# Patient Record
Sex: FEMALE | Race: WHITE | HISPANIC OR LATINO | Employment: FULL TIME | URBAN - METROPOLITAN AREA
[De-identification: names, ages, dates, MRNs, and addresses within clinical notes are randomized per-mention and may not be internally consistent; named-entity substitution may affect disease eponyms.]

---

## 2017-04-10 ENCOUNTER — OFFICE VISIT (OUTPATIENT)
Dept: OBSTETRICS AND GYNECOLOGY | Facility: CLINIC | Age: 52
End: 2017-04-10
Payer: COMMERCIAL

## 2017-04-10 VITALS
BODY MASS INDEX: 29.76 KG/M2 | WEIGHT: 157.63 LBS | SYSTOLIC BLOOD PRESSURE: 120 MMHG | HEIGHT: 61 IN | DIASTOLIC BLOOD PRESSURE: 78 MMHG

## 2017-04-10 DIAGNOSIS — Z01.419 ROUTINE GYNECOLOGICAL EXAMINATION: Primary | ICD-10-CM

## 2017-04-10 PROCEDURE — 99999 PR PBB SHADOW E&M-NEW PATIENT-LVL III: CPT | Mod: PBBFAC,,, | Performed by: OBSTETRICS & GYNECOLOGY

## 2017-04-10 PROCEDURE — 99386 PREV VISIT NEW AGE 40-64: CPT | Mod: S$GLB,,, | Performed by: OBSTETRICS & GYNECOLOGY

## 2017-04-10 PROCEDURE — 88175 CYTOPATH C/V AUTO FLUID REDO: CPT

## 2017-04-10 RX ORDER — ATORVASTATIN CALCIUM 40 MG/1
40 TABLET, FILM COATED ORAL DAILY
COMMUNITY
End: 2019-01-14

## 2017-04-10 NOTE — PROGRESS NOTES
PT HERE FOR ANNUAL.    ROS:  GENERAL: No fever, chills, fatigability or weight loss.  VULVAR: No pain, no lesions and no itching.  VAGINAL: No relaxation, no itching, no discharge, no abnormal bleeding and no lesions.  ABDOMEN: No abdominal pain. Denies nausea. Denies vomiting. No diarrhea. No constipation  BREAST: Denies pain. No lumps. No discharge.  URINARY: No incontinence, no nocturia, no frequency and no dysuria.  CARDIOVASCULAR: No chest pain. No shortness of breath. No leg cramps.  NEUROLOGICAL: No headaches. No vision changes.  The remainder of the review of systems was negative.    PE:  General Appearance: obese And Well developed. Well nourished. In no acute distress.  Vulva: Lesions: No.  Urethral Meatus: Normal size. Normal location. No lesions. No prolapse.  Urethra: No masses. No tenderness. No prolapse. No scarring.  Bladder: No masses. No tenderness.  Vagina: Mucosa NI:yes discharge no, atrophy yes, cystocele no or rectocele no.  Cervix: Lesion: no  Stenotic: no Cervical motion tenderness: no  Uterus: Uterus size: 5 weeks. Support good. Uterus size: Normal  Adnexa: Masses: No Tenderness: No CDS Nodularity: No  Abdomen: obese No masses. No tenderness.  Breasts: No bilateral masses. No bilateral discharge. No bilateral tenderness. No bilateral fibrocystic changes.  Neck: No thyroid enlargement. No thyroid masses.  Skin: Rashes: No      PROCEDURES:    PLAN:     DIAGNOSIS:  1. Routine gynecological examination        MEDICATIONS & ORDERS:  Orders Placed This Encounter    Mammo Digital Screening Bilat with CAD    Liquid-based pap smear, screening       Patient was counseled today on the new ACS guidelines for cervical cytology screening as well as the current recommendations for breast cancer screening. She was counseled to follow up with her PCP for other routine health maintenance. Counseling session lasted approximately 10 minutes, and all her questions were answered.         FOLLOW-UP: With me in  12 month

## 2018-06-08 ENCOUNTER — HOSPITAL ENCOUNTER (EMERGENCY)
Facility: HOSPITAL | Age: 53
Discharge: HOME | End: 2018-06-09
Attending: EMERGENCY MEDICINE
Payer: COMMERCIAL

## 2018-06-08 DIAGNOSIS — R10.9 ABDOMINAL PAIN: ICD-10-CM

## 2018-06-08 DIAGNOSIS — R53.1 WEAKNESS: ICD-10-CM

## 2018-06-08 DIAGNOSIS — K80.20 CALCULUS OF GALLBLADDER WITHOUT CHOLECYSTITIS WITHOUT OBSTRUCTION: Primary | ICD-10-CM

## 2018-06-08 LAB
ALBUMIN SERPL BCP-MCNC: 3.9 G/DL
ALP SERPL-CCNC: 103 U/L
ALT SERPL W/O P-5'-P-CCNC: 24 U/L
ANION GAP SERPL CALC-SCNC: 9 MMOL/L
AST SERPL-CCNC: 23 U/L
B-HCG UR QL: NEGATIVE
BASOPHILS # BLD AUTO: 0.05 K/UL
BASOPHILS NFR BLD: 0.4 %
BILIRUB SERPL-MCNC: 0.5 MG/DL
BILIRUB UR QL STRIP: NEGATIVE
BUN SERPL-MCNC: 14 MG/DL
CALCIUM SERPL-MCNC: 9.6 MG/DL
CHLORIDE SERPL-SCNC: 105 MMOL/L
CLARITY UR: CLEAR
CO2 SERPL-SCNC: 25 MMOL/L
COLOR UR: YELLOW
CREAT SERPL-MCNC: 0.9 MG/DL
CTP QC/QA: YES
DIFFERENTIAL METHOD: ABNORMAL
EOSINOPHIL # BLD AUTO: 0.1 K/UL
EOSINOPHIL NFR BLD: 0.7 %
ERYTHROCYTE [DISTWIDTH] IN BLOOD BY AUTOMATED COUNT: 13.4 %
EST. GFR  (AFRICAN AMERICAN): >60 ML/MIN/1.73 M^2
EST. GFR  (NON AFRICAN AMERICAN): >60 ML/MIN/1.73 M^2
GLUCOSE SERPL-MCNC: 96 MG/DL
GLUCOSE UR QL STRIP: NEGATIVE
HCT VFR BLD AUTO: 40.9 %
HGB BLD-MCNC: 13.1 G/DL
HGB UR QL STRIP: ABNORMAL
KETONES UR QL STRIP: NEGATIVE
LEUKOCYTE ESTERASE UR QL STRIP: NEGATIVE
LYMPHOCYTES # BLD AUTO: 2.8 K/UL
LYMPHOCYTES NFR BLD: 20.9 %
MCH RBC QN AUTO: 28.5 PG
MCHC RBC AUTO-ENTMCNC: 32 G/DL
MCV RBC AUTO: 89 FL
MONOCYTES # BLD AUTO: 1.1 K/UL
MONOCYTES NFR BLD: 8.2 %
NEUTROPHILS # BLD AUTO: 9.4 K/UL
NEUTROPHILS NFR BLD: 69.5 %
NITRITE UR QL STRIP: NEGATIVE
PH UR STRIP: 7 [PH] (ref 5–8)
PLATELET # BLD AUTO: 288 K/UL
PMV BLD AUTO: 10.4 FL
POTASSIUM SERPL-SCNC: 3.9 MMOL/L
PROT SERPL-MCNC: 7.8 G/DL
PROT UR QL STRIP: ABNORMAL
RBC # BLD AUTO: 4.59 M/UL
SODIUM SERPL-SCNC: 139 MMOL/L
SP GR UR STRIP: 1.02 (ref 1–1.03)
URN SPEC COLLECT METH UR: ABNORMAL
UROBILINOGEN UR STRIP-ACNC: NEGATIVE EU/DL
WBC # BLD AUTO: 13.57 K/UL

## 2018-06-08 PROCEDURE — 83690 ASSAY OF LIPASE: CPT

## 2018-06-08 PROCEDURE — 63600175 PHARM REV CODE 636 W HCPCS: Performed by: EMERGENCY MEDICINE

## 2018-06-08 PROCEDURE — 99284 EMERGENCY DEPT VISIT MOD MDM: CPT | Mod: 25

## 2018-06-08 PROCEDURE — 96361 HYDRATE IV INFUSION ADD-ON: CPT

## 2018-06-08 PROCEDURE — 93005 ELECTROCARDIOGRAM TRACING: CPT

## 2018-06-08 PROCEDURE — 81025 URINE PREGNANCY TEST: CPT | Performed by: EMERGENCY MEDICINE

## 2018-06-08 PROCEDURE — 96374 THER/PROPH/DIAG INJ IV PUSH: CPT

## 2018-06-08 PROCEDURE — 85025 COMPLETE CBC W/AUTO DIFF WBC: CPT

## 2018-06-08 PROCEDURE — 93010 ELECTROCARDIOGRAM REPORT: CPT | Mod: ,,, | Performed by: INTERNAL MEDICINE

## 2018-06-08 PROCEDURE — 25000003 PHARM REV CODE 250: Performed by: EMERGENCY MEDICINE

## 2018-06-08 PROCEDURE — 81003 URINALYSIS AUTO W/O SCOPE: CPT

## 2018-06-08 PROCEDURE — 80053 COMPREHEN METABOLIC PANEL: CPT

## 2018-06-08 RX ORDER — ONDANSETRON 2 MG/ML
4 INJECTION INTRAMUSCULAR; INTRAVENOUS
Status: COMPLETED | OUTPATIENT
Start: 2018-06-08 | End: 2018-06-08

## 2018-06-08 RX ADMIN — SODIUM CHLORIDE 1000 ML: 0.9 INJECTION, SOLUTION INTRAVENOUS at 09:06

## 2018-06-08 RX ADMIN — ONDANSETRON 4 MG: 2 INJECTION INTRAMUSCULAR; INTRAVENOUS at 09:06

## 2018-06-09 VITALS
HEIGHT: 61 IN | TEMPERATURE: 99 F | OXYGEN SATURATION: 100 % | BODY MASS INDEX: 31.91 KG/M2 | DIASTOLIC BLOOD PRESSURE: 77 MMHG | HEART RATE: 70 BPM | WEIGHT: 169 LBS | SYSTOLIC BLOOD PRESSURE: 126 MMHG | RESPIRATION RATE: 16 BRPM

## 2018-06-09 LAB — LIPASE SERPL-CCNC: 27 U/L

## 2018-06-09 RX ORDER — HYDROCODONE BITARTRATE AND ACETAMINOPHEN 5; 325 MG/1; MG/1
1 TABLET ORAL EVERY 4 HOURS PRN
Qty: 18 TABLET | Refills: 0 | Status: SHIPPED | OUTPATIENT
Start: 2018-06-09 | End: 2019-01-14

## 2018-06-09 RX ORDER — ONDANSETRON 4 MG/1
4 TABLET, ORALLY DISINTEGRATING ORAL EVERY 6 HOURS PRN
Qty: 12 TABLET | Refills: 0 | Status: SHIPPED | OUTPATIENT
Start: 2018-06-09 | End: 2019-01-14

## 2018-06-09 NOTE — ED NOTES
Patient stated she has a little pain from having the US. States pain can be felt across lower abdomen but more on right side.

## 2018-06-09 NOTE — ED NOTES
Pt states she is having pain in rlq which is radiating around back and down to groin. She says she has hemorhoids and it's causing her to push very hard to have a bm.

## 2018-06-09 NOTE — ED PROVIDER NOTES
Encounter Date: 2018    SCRIBE #1 NOTE: I, Ted Johnson, am scribing for, and in the presence of, Dr. Monterroso.       History     Chief Complaint   Patient presents with    Abdominal Pain     To ER with c/o RLQ pain radiating to groin and around her abd with frequent urinataion and vomiting starting today.  pt just completed antibiotics yesterday for UTI.     Savannah Guerrero is a 52 y.o. female who  has no past medical history on file.    The patient presents to the ED due to lower abdominal pain starting today. The pain radiates to her bilateral flanks. She notes urinary frequency, nausea, and frequent vomiting today as well. Patient saw a urologist 1 week ago due to hematuria. She was prescribed antibiotics at that time. Patient denies fever or diarrhea.      The history is provided by the patient.     Review of patient's allergies indicates:  No Known Allergies  No past medical history on file.  Past Surgical History:   Procedure Laterality Date     SECTION  1994     Family History   Problem Relation Age of Onset    Breast cancer Neg Hx     Cancer Neg Hx     Colon cancer Neg Hx     Ovarian cancer Neg Hx      Social History   Substance Use Topics    Smoking status: Never Smoker    Smokeless tobacco: Not on file    Alcohol use No     Review of Systems   Constitutional: Negative for fever.   Gastrointestinal: Positive for abdominal pain, nausea and vomiting. Negative for diarrhea.   Genitourinary: Positive for flank pain and frequency.   All other systems reviewed and are negative.      Physical Exam     Initial Vitals [18]   BP Pulse Resp Temp SpO2   (!) 157/77 84 18 98.3 °F (36.8 °C) 100 %      MAP       103.67         Physical Exam    Nursing note and vitals reviewed.  Constitutional: She appears well-developed and well-nourished.   HENT:   Head: Normocephalic and atraumatic.   Eyes: Conjunctivae and EOM are normal. Pupils are equal, round, and reactive to light.   Neck: Normal range of  motion. Neck supple.   Cardiovascular: Normal rate, regular rhythm and normal heart sounds.   Pulmonary/Chest: Breath sounds normal. No respiratory distress. She has no wheezes.   Abdominal: Soft. She exhibits no distension. There is no tenderness. There is no guarding.   Musculoskeletal: Normal range of motion.   Neurological: She is alert and oriented to person, place, and time.   Skin: Skin is warm and dry.   Psychiatric: She has a normal mood and affect.         ED Course   Procedures  Labs Reviewed   CBC W/ AUTO DIFFERENTIAL - Abnormal; Notable for the following:        Result Value    WBC 13.57 (*)     Gran # (ANC) 9.4 (*)     Mono # 1.1 (*)     All other components within normal limits   LIPASE - Abnormal; Notable for the following:     Lipase <3 (*)     All other components within normal limits   URINALYSIS - Abnormal; Notable for the following:     Protein, UA Trace (*)     Occult Blood UA Trace (*)     All other components within normal limits   COMPREHENSIVE METABOLIC PANEL   URINALYSIS   POCT URINE PREGNANCY     EKG Readings: (Independently Interpreted)   Rhythm: Normal Sinus Rhythm. Heart Rate: 69. Ectopy: No Ectopy. Conduction: Normal. ST Segments: Normal ST Segments. T Waves: Normal. Clinical Impression: Normal Sinus Rhythm     Imaging Results          US Abdomen Limited (Gallbladder) (Final result)  Result time 06/09/18 00:50:56    Final result by Zbigniew Mcintyre MD (06/09/18 00:50:56)                 Impression:      1. Cholelithiasis.  No ultrasonographic evidence to suggest acute cholecystitis.  2. Right extrarenal pelvis versus mild hydronephrosis.  3. Cystic lesion at the level of the pancreatic tail measuring 2.5 cm.  No prior studies are available for comparison.  Recommend future nonemergent MR or CT follow-up if no previous outside imaging is available for comparison.  4. Mild hepatomegaly.      Electronically signed by: Zbigniew Mcintyre MD  Date:    06/09/2018  Time:    00:50              Narrative:    EXAMINATION:  US ABDOMEN LIMITED    CLINICAL HISTORY:  Unspecified abdominal pain    TECHNIQUE:  Limited ultrasound of the right upper quadrant of the abdomen (including pancreas, liver, gallbladder, common bile duct, and right kidney) was performed.    COMPARISON:  None.    FINDINGS:  The liver is is mildly enlarged measuring 18.5cm.  Hepatic parenchyma is homogeneous without evidence for masses.  No intra- or extrahepatic biliary ductal dilatation. The common bile duct measures 0.4 cm.  The gallbladder demonstrates two mobile stones, the larger of which measures 0.7 cm.  No evidence of gallbladder wall thickening or pericholecystic fluid.  Sonographic Deleon's sign is negative. The visualized portions of the IVC appear normal.  There is anechoic cystic lesion seen in the region of the pancreatic tail measuring 2.5 x 2.3 x 2.2 cm.  The right kidney measures 12.2 cm with prominent extrarenal pelvis versus mild hydronephrosis..  No ascites.                               X-Ray Abdomen Flat And Erect (Final result)  Result time 06/08/18 22:14:32    Final result by Shalonda Espinoza MD (06/08/18 22:14:32)                 Impression:      As above.      Electronically signed by: Shalonda Espinoza MD  Date:    06/08/2018  Time:    22:14             Narrative:    EXAMINATION:  XR ABDOMEN FLAT AND ERECT    CLINICAL HISTORY:  Abdominal Pain;    TECHNIQUE:  Flat and erect AP views of the abdomen were performed.    COMPARISON:  None.    FINDINGS:  Scattered air is seen in nondilated loops of small and large bowel with fecal material scattered throughout the colon.  No focally dilated loops of small bowel or small bowel air-fluid levels are identified.  No definite evidence of free intraperitoneal air.  There are punctate pelvic phleboliths present.  The visualized osseous structures appear intact.The visualized lung bases appear clear.                                   Medical Decision Making:   ED Management:  Pt has  gallstones on her u/s as possible cause of the pain. Her pain has resolved at this time.  It is intermittent pain.  Labs and vs are wnl. Will dc pt to f/u w gen surg              Attending Attestation:           Physician Attestation for Scribe:  Physician Attestation Statement for Scribe #1: I, Alek Monterroso, reviewed documentation, as scribed by Ted curtis in my presence, and it is both accurate and complete.                    Clinical Impression:   The primary encounter diagnosis was Calculus of gallbladder without cholecystitis without obstruction. Diagnoses of Weakness and Abdominal pain were also pertinent to this visit.      Disposition:   Disposition: Discharged  Condition: Stable                      Alek Monterroso MD  06/09/18 0117

## 2018-06-18 PROBLEM — K80.20 CALCULUS OF GALLBLADDER WITHOUT CHOLECYSTITIS WITHOUT OBSTRUCTION: Status: ACTIVE | Noted: 2018-06-18

## 2018-06-18 PROBLEM — R10.10 PAIN OF UPPER ABDOMEN: Status: ACTIVE | Noted: 2018-06-18

## 2018-06-18 PROBLEM — K86.2 CYSTIC MASS OF PANCREAS: Status: ACTIVE | Noted: 2018-06-18

## 2018-06-28 ENCOUNTER — OFFICE VISIT (OUTPATIENT)
Dept: GASTROENTEROLOGY | Facility: CLINIC | Age: 53
End: 2018-06-28
Payer: COMMERCIAL

## 2018-06-28 VITALS — WEIGHT: 163 LBS | BODY MASS INDEX: 30.8 KG/M2

## 2018-06-28 DIAGNOSIS — R21 RASH: ICD-10-CM

## 2018-06-28 DIAGNOSIS — K86.2 CYSTIC MASS OF PANCREAS: Primary | ICD-10-CM

## 2018-06-28 PROCEDURE — 99205 OFFICE O/P NEW HI 60 MIN: CPT | Mod: S$GLB,,, | Performed by: INTERNAL MEDICINE

## 2018-06-28 PROCEDURE — 99999 PR PBB SHADOW E&M-EST. PATIENT-LVL II: CPT | Mod: PBBFAC,,,

## 2018-06-28 PROCEDURE — 3008F BODY MASS INDEX DOCD: CPT | Mod: CPTII,S$GLB,, | Performed by: INTERNAL MEDICINE

## 2018-06-28 NOTE — PATIENT INSTRUCTIONS
EUS scheduled on  07/03/2018 with  Dr. Tray Whitley.  Location and instructions explained to patient. NPO after midnight.  Clear liquids after 700pm. Lab will call two days before with arrival time. Must have transportation due to sedation. Verbalized understanding.

## 2018-06-28 NOTE — PROGRESS NOTES
SUBJECTIVE:         Chief Complaint: Here for evaluation of a pancreatic cyst     History of Present Illness:  Patient is a 52 y.o. female presents with a pancreatic cyst. The cyst was first noted one month ago. It's located in the the tail.  It measures 25 mm  in size.  It is not symptomatic. She had seen Dr Martinez for abdominal pain related to her gallstone.  The Ultrasound for that work up showed the cyst Previous studies include CT scan, however we do not have the results of this.    Previous FNA of the cyst has not been done    There is not a family history of pancreatic cancer     The patient does not smoke.    ECOG status 0 - Asymptomatic    On a separate issue, she noticed a rash along her arms and chest and back yesterday.  Her CT scan with contrast was Thursday (temporally unrelated) She has no sick contacts and no recent new foods or medications. The rash is itchy    (Not in a hospital admission)    Review of patient's allergies indicates:  No Known Allergies     Past Medical History:   Diagnosis Date    Hyperlipidemia     Hypertension      Past Surgical History:   Procedure Laterality Date     SECTION       Family History   Problem Relation Age of Onset    Breast cancer Neg Hx     Cancer Neg Hx     Colon cancer Neg Hx     Ovarian cancer Neg Hx      Social History   Substance Use Topics    Smoking status: Never Smoker    Smokeless tobacco: Never Used    Alcohol use No     Vital signs reviewed  General: well-appearing, no acute distress   Neuro: awake, alert, oriented to person place and time; no asterixis   Head: normocephalic, atraumatic   Nose: no nasal lesions or turbinate swelling   Mouth: dentition intact; no oral mucosal lesions; no pharyngeal erythema   Neck: supple; no thyromegaly   CV: S1S2, regular rate,    MS: no skeletal deformities noted   Skin: red macular rash along anterior arms and chest.  No vesicle  Lymph: no cervical or supraclavicular LAD  noted    Diagnostic Results:  US: Reviewed      ASSESSMENT/PLAN:     Pancreatic cyst in the the tail. Measuring 25mm in size,   Most likely etiology at this point is a indeterminate  Year 0  We discussed in detail the natural history of pancreatic cysts, the therapy involved, and the benefits of multimodality imaging including Ct, MRI, and EUS with FNA    Plan:   We will plan for an EUS to assess the size and characteristics.  Of the cyst and plan for further surveillance afterwards    I have limited familiarity with her rash and encouraged her to reach out to her PCP

## 2018-07-03 ENCOUNTER — ANESTHESIA (OUTPATIENT)
Dept: ENDOSCOPY | Facility: HOSPITAL | Age: 53
End: 2018-07-03
Payer: COMMERCIAL

## 2018-07-03 ENCOUNTER — HOSPITAL ENCOUNTER (OUTPATIENT)
Facility: HOSPITAL | Age: 53
Discharge: HOME | End: 2018-07-03
Attending: INTERNAL MEDICINE | Admitting: INTERNAL MEDICINE
Payer: COMMERCIAL

## 2018-07-03 ENCOUNTER — ANESTHESIA EVENT (OUTPATIENT)
Dept: ENDOSCOPY | Facility: HOSPITAL | Age: 53
End: 2018-07-03
Payer: COMMERCIAL

## 2018-07-03 VITALS
RESPIRATION RATE: 60 BRPM | DIASTOLIC BLOOD PRESSURE: 96 MMHG | TEMPERATURE: 97 F | SYSTOLIC BLOOD PRESSURE: 144 MMHG | WEIGHT: 165 LBS | HEART RATE: 59 BPM | HEIGHT: 61 IN | BODY MASS INDEX: 31.15 KG/M2 | OXYGEN SATURATION: 100 %

## 2018-07-03 DIAGNOSIS — K86.2 CYSTIC MASS OF PANCREAS: Primary | ICD-10-CM

## 2018-07-03 DIAGNOSIS — K86.2 PANCREAS CYST: ICD-10-CM

## 2018-07-03 DIAGNOSIS — K29.70 GASTRITIS, PRESENCE OF BLEEDING UNSPECIFIED, UNSPECIFIED CHRONICITY, UNSPECIFIED GASTRITIS TYPE: ICD-10-CM

## 2018-07-03 PROCEDURE — 88112 CYTOPATH CELL ENHANCE TECH: CPT | Performed by: PATHOLOGY

## 2018-07-03 PROCEDURE — 43239 EGD BIOPSY SINGLE/MULTIPLE: CPT

## 2018-07-03 PROCEDURE — 63600175 PHARM REV CODE 636 W HCPCS: Performed by: NURSE ANESTHETIST, CERTIFIED REGISTERED

## 2018-07-03 PROCEDURE — 27201012 HC FORCEPS, HOT/COLD, DISP: Performed by: INTERNAL MEDICINE

## 2018-07-03 PROCEDURE — 27202059 HC NEEDLE, FNA (ANY): Performed by: INTERNAL MEDICINE

## 2018-07-03 PROCEDURE — 88305 TISSUE EXAM BY PATHOLOGIST: CPT | Mod: 26,,, | Performed by: PATHOLOGY

## 2018-07-03 PROCEDURE — 43242 EGD US FINE NEEDLE BX/ASPIR: CPT | Mod: ,,, | Performed by: INTERNAL MEDICINE

## 2018-07-03 PROCEDURE — 25000003 PHARM REV CODE 250: Performed by: INTERNAL MEDICINE

## 2018-07-03 PROCEDURE — 37000009 HC ANESTHESIA EA ADD 15 MINS: Performed by: INTERNAL MEDICINE

## 2018-07-03 PROCEDURE — 37000008 HC ANESTHESIA 1ST 15 MINUTES: Performed by: INTERNAL MEDICINE

## 2018-07-03 PROCEDURE — 43238 EGD US FINE NEEDLE BX/ASPIR: CPT | Performed by: INTERNAL MEDICINE

## 2018-07-03 PROCEDURE — 88342 IMHCHEM/IMCYTCHM 1ST ANTB: CPT | Performed by: PATHOLOGY

## 2018-07-03 PROCEDURE — 88342 IMHCHEM/IMCYTCHM 1ST ANTB: CPT | Mod: 26,,, | Performed by: PATHOLOGY

## 2018-07-03 PROCEDURE — 82378 CARCINOEMBRYONIC ANTIGEN: CPT

## 2018-07-03 PROCEDURE — 88305 TISSUE EXAM BY PATHOLOGIST: CPT | Performed by: PATHOLOGY

## 2018-07-03 PROCEDURE — 82150 ASSAY OF AMYLASE: CPT

## 2018-07-03 PROCEDURE — 43239 EGD BIOPSY SINGLE/MULTIPLE: CPT | Mod: 59,,, | Performed by: INTERNAL MEDICINE

## 2018-07-03 PROCEDURE — 88112 CYTOPATH CELL ENHANCE TECH: CPT | Mod: 26,,, | Performed by: PATHOLOGY

## 2018-07-03 RX ORDER — LIDOCAINE HCL/PF 100 MG/5ML
SYRINGE (ML) INTRAVENOUS
Status: DISCONTINUED | OUTPATIENT
Start: 2018-07-03 | End: 2018-07-03

## 2018-07-03 RX ORDER — CIPROFLOXACIN 2 MG/ML
INJECTION, SOLUTION INTRAVENOUS
Status: DISCONTINUED | OUTPATIENT
Start: 2018-07-03 | End: 2018-07-03

## 2018-07-03 RX ORDER — PROPOFOL 10 MG/ML
VIAL (ML) INTRAVENOUS
Status: DISCONTINUED | OUTPATIENT
Start: 2018-07-03 | End: 2018-07-03

## 2018-07-03 RX ORDER — PROPOFOL 10 MG/ML
VIAL (ML) INTRAVENOUS CONTINUOUS PRN
Status: DISCONTINUED | OUTPATIENT
Start: 2018-07-03 | End: 2018-07-03

## 2018-07-03 RX ORDER — CIPROFLOXACIN 500 MG/1
500 TABLET ORAL EVERY 12 HOURS
Qty: 10 TABLET | Refills: 0 | Status: SHIPPED | OUTPATIENT
Start: 2018-07-03 | End: 2018-07-08

## 2018-07-03 RX ORDER — SODIUM CHLORIDE 9 MG/ML
INJECTION, SOLUTION INTRAVENOUS CONTINUOUS
Status: DISCONTINUED | OUTPATIENT
Start: 2018-07-03 | End: 2018-07-03 | Stop reason: HOSPADM

## 2018-07-03 RX ADMIN — PROPOFOL 150 MCG/KG/MIN: 10 INJECTION, EMULSION INTRAVENOUS at 09:07

## 2018-07-03 RX ADMIN — CIPROFLOXACIN 400 MG: 2 INJECTION, SOLUTION INTRAVENOUS at 10:07

## 2018-07-03 RX ADMIN — PROPOFOL 70 MG: 10 INJECTION, EMULSION INTRAVENOUS at 09:07

## 2018-07-03 RX ADMIN — LIDOCAINE HYDROCHLORIDE 100 MG: 20 INJECTION, SOLUTION INTRAVENOUS at 09:07

## 2018-07-03 RX ADMIN — SODIUM CHLORIDE: 0.9 INJECTION, SOLUTION INTRAVENOUS at 08:07

## 2018-07-03 NOTE — H&P
History & Physical - Short Stay  Gastroenterology      SUBJECTIVE:     Procedure: EUS    Chief Complaint/Indication for Procedure: pancreas cyst    History of Present Illness:  Patient is a 52 y.o. female with pancreas cyst coming for Eval with EUS.     PTA Medications   Medication Sig    HYDROcodone-acetaminophen (NORCO) 5-325 mg per tablet Take 1 tablet by mouth every 4 (four) hours as needed.    ondansetron (ZOFRAN-ODT) 4 MG TbDL Take 1 tablet (4 mg total) by mouth every 6 (six) hours as needed.    atorvastatin (LIPITOR) 40 MG tablet Take 40 mg by mouth once daily.    lisinopril (PRINIVIL,ZESTRIL) 40 MG tablet Take 40 mg by mouth once daily.    OMEPRAZOLE ORAL Take by mouth.       Review of patient's allergies indicates:  No Known Allergies     Past Medical History:   Diagnosis Date    Hyperlipidemia     Hypertension      Past Surgical History:   Procedure Laterality Date     SECTION       Family History   Problem Relation Age of Onset    Breast cancer Neg Hx     Cancer Neg Hx     Colon cancer Neg Hx     Ovarian cancer Neg Hx      Social History   Substance Use Topics    Smoking status: Never Smoker    Smokeless tobacco: Never Used    Alcohol use No       Review of Systems:  Constitutional: no fever or chills  Gastrointestinal: no nausea or vomiting, no abdominal pain or change in bowel habits    OBJECTIVE:     Vital Signs (Most Recent)  Temp: 97.9 °F (36.6 °C) (18)  Pulse: 64 (18)  Resp: 16 (18)  BP: 127/87 (18)  SpO2: 98 % (18)       ASSESSMENT/PLAN:     Patient is a 52 y.o. female with pancreas cyst coming for Eval with EUS.     Plan: EUS    Anesthesia Plan: Moderate Sedation    ASA Grade: ASA 2 - Patient with mild systemic disease with no functional limitations

## 2018-07-03 NOTE — TRANSFER OF CARE
"Anesthesia Transfer of Care Note    Patient: Savannah Guerrero    Procedure(s) Performed: Procedure(s) (LRB):  ULTRASOUND, ENDOSCOPIC, UPPER GI TRACT (N/A)  EGD (ESOPHAGOGASTRODUODENOSCOPY) (N/A)    Patient location: GI    Anesthesia Type: MAC    Transport from OR: Transported from OR on room air with adequate spontaneous ventilation    Post pain: adequate analgesia    Post assessment: no apparent anesthetic complications and tolerated procedure well    Post vital signs: stable    Level of consciousness: awake, alert and oriented    Nausea/Vomiting: no nausea/vomiting    Complications: none    Transfer of care protocol was followed      Last vitals:   Visit Vitals  /87 (BP Location: Left arm, Patient Position: Lying)   Pulse 64   Temp 36.6 °C (97.9 °F) (Tympanic)   Resp 16   Ht 5' 1" (1.549 m)   Wt 74.8 kg (165 lb)   SpO2 98%   Breastfeeding? No   BMI 31.18 kg/m²     "

## 2018-07-03 NOTE — [employer] PATIENT INSTRUCTIONS
Discharge Summary/Instructions after an Endoscopic Procedure  Patient Name: Savannah Guerrero  Patient MRN: [id number]  Patient YOB: 1965 Tuesday, July 03, 2018  Efren Whitley MD  RESTRICTIONS:  During your procedure today, you received medications for sedation.  These   medications may affect your judgment, balance and coordination.  Therefore,   for 24 hours, you have the following restrictions:   - DO NOT drive a car, operate machinery, make legal/financial decisions,   sign important papers or drink alcohol.    ACTIVITY:  Today: no heavy lifting, straining or running due to procedural   sedation/anesthesia.  The following day: return to full activity including work.  DIET:  Eat and drink normally unless instructed otherwise.     TREATMENT FOR COMMON SIDE EFFECTS:  - Mild abdominal pain, nausea, belching, bloating or excessive gas:  rest,   eat lightly and use a heating pad.  - Sore Throat: treat with throat lozenges and/or gargle with warm salt   water.  - Because air was used during the procedure, expelling large amounts of air   from your rectum or belching is normal.  - If a bowel prep was taken, you may not have a bowel movement for 1-3 days.    This is normal.  SYMPTOMS TO WATCH FOR AND REPORT TO YOUR PHYSICIAN:  1. Abdominal pain or bloating, other than gas cramps.  2. Chest pain.  3. Back pain.  4. Signs of infection such as: chills or fever occurring within 24 hours   after the procedure.  5. Rectal bleeding, which would show as bright red, maroon, or black stools.   (A tablespoon of blood from the rectum is not serious, especially if   hemorrhoids are present.)  6. Vomiting.  7. Weakness or dizziness.  GO DIRECTLY TO THE NEAREST EMERGENCY ROOM IF YOU HAVE ANY OF THE FOLLOWING:      Difficulty breathing              Chills and/or fever over 101 F   Persistent vomiting and/or vomiting blood   Severe abdominal pain   Severe chest pain   Black, tarry stools   Bleeding- more than one  tablespoon   Any other symptom or condition that you feel may need urgent attention  Your doctor recommends these additional instructions:  If any biopsies were taken, your doctors clinic will contact you in 1 to 2   weeks with any results.  - Discharge patient to home.   - Resume previous diet.   - Continue present medications.   - Return to referring physician.   - Await cytology results.   - Ciprofloxacin 500 mg PO Bid for 3 days.  For questions, problems or results please call your physician - Efren Whitley MD at Work:  [phone].  EMERGENCY PHONE NUMBER: [phone],  LAB RESULTS: [phone]  IF A COMPLICATION OR EMERGENCY SITUATION ARISES AND YOU ARE UNABLE TO REACH   YOUR PHYSICIAN - GO DIRECTLY TO THE EMERGENCY ROOM.  Efren Whitley MD  7/3/2018 10:37:05 AM  This report has been verified and signed electronically.  [employer]

## 2018-07-03 NOTE — ANESTHESIA PREPROCEDURE EVALUATION
07/03/2018  Savannah Guerrero is a 52 y.o., female for upper EUS under MAC    Past Medical History:   Diagnosis Date    Hyperlipidemia     Hypertension          Anesthesia Evaluation    I have reviewed the Patient Summary Reports.     I have reviewed the Medications.     Review of Systems  Social:  Non-Smoker, No Alcohol Use    Cardiovascular:   Exercise tolerance: good Hypertension hyperlipidemia    Pulmonary:  Pulmonary Normal    Renal/:  Renal/ Normal     Hepatic/GI:   Pancreatic mass       Physical Exam  General:  Well nourished, Obesity    Airway/Jaw/Neck:  Airway Findings: Mouth Opening: Normal Tongue: Normal  General Airway Assessment: Adult  Mallampati: II  TM Distance: Normal, at least 6 cm      Dental:  Dental Findings: Periodontal disease, Mild   Chest/Lungs:  Chest/Lungs Clear    Heart/Vascular:  Heart Findings: Normal       Mental Status:  Mental Status Findings:  Alert and Oriented       Lab Results   Component Value Date    WBC 13.57 (H) 06/08/2018    HGB 13.1 06/08/2018    HCT 40.9 06/08/2018     06/08/2018    ALT 24 06/08/2018    AST 23 06/08/2018     06/08/2018    K 3.9 06/08/2018     06/08/2018    CREATININE 0.9 06/08/2018    BUN 14 06/08/2018    CO2 25 06/08/2018       Normal sinus rhythm  Normal ECG  No previous ECGs available  Confirmed by Elia ALVARADO, [facility] ([id number]) on 6/11/2018 2:42:34 PM    Anesthesia Plan  Type of Anesthesia, risks & benefits discussed:  Anesthesia Type:  MAC, general  Patient's Preference:   Intra-op Monitoring Plan:   Intra-op Monitoring Plan Comments:   Post Op Pain Control Plan:   Post Op Pain Control Plan Comments:   Induction:    Beta Blocker:  Patient is not currently on a Beta-Blocker (No further documentation required).       Informed Consent: Patient understands risks and agrees with Anesthesia plan.  Questions answered.  Anesthesia consent signed with patient.  ASA Score: 2     Day of Surgery Review of History & Physical:            Ready For Surgery From Anesthesia Perspective.

## 2018-07-03 NOTE — ANESTHESIA POSTPROCEDURE EVALUATION
"Anesthesia Post Evaluation    Patient: Savannah Guerrero    Procedure(s) Performed: Procedure(s) (LRB):  ULTRASOUND, ENDOSCOPIC, UPPER GI TRACT (N/A)  EGD (ESOPHAGOGASTRODUODENOSCOPY) (N/A)    Final Anesthesia Type: MAC  Patient location during evaluation: GI PACU  Patient participation: Yes- Able to Participate  Level of consciousness: awake and alert and oriented  Post-procedure vital signs: reviewed and stable  Pain management: adequate  Airway patency: patent  PONV status at discharge: No PONV  Anesthetic complications: no      Cardiovascular status: blood pressure returned to baseline, hemodynamically stable and stable  Respiratory status: spontaneous ventilation, room air and unassisted  Hydration status: euvolemic  Follow-up not needed.        Visit Vitals  /87 (BP Location: Left arm, Patient Position: Lying)   Pulse 64   Temp 36.6 °C (97.9 °F) (Tympanic)   Resp 16   Ht 5' 1" (1.549 m)   Wt 74.8 kg (165 lb)   SpO2 98%   Breastfeeding? No   BMI 31.18 kg/m²       Pain/Rohan Score: Pain Assessment Performed: Yes (7/3/2018  8:48 AM)  Presence of Pain: denies (7/3/2018  8:48 AM)      "

## 2018-07-05 LAB
AMYLASE, PANCREATIC FLUID: NORMAL U/L
BDY SITE: NORMAL
BDY SITE: NORMAL
CEA FLD-MCNC: 444 NG/ML

## 2018-07-11 ENCOUNTER — TELEPHONE (OUTPATIENT)
Dept: GASTROENTEROLOGY | Facility: CLINIC | Age: 53
End: 2018-07-11

## 2018-07-11 NOTE — TELEPHONE ENCOUNTER
----- Message from Stephanie Grover MA sent at 7/9/2018  4:26 PM CDT -----      ----- Message -----  From: Efren Whitley MD  Sent: 7/9/2018  11:16 AM  To: Stephanie Grover MA    Benign results but cyst could be premalignant and needs to be followed. Please let the patient know that we will discuss the timing of her next follow up in a cyst meeting and add to the list. Thanks

## 2018-07-11 NOTE — TELEPHONE ENCOUNTER
Results given to patient. Patient verbalized understanding. Will follow up with patient with recommendations after discussion with cyst team per Dr. Tray Whitley..

## 2018-07-19 ENCOUNTER — TELEPHONE (OUTPATIENT)
Dept: GASTROENTEROLOGY | Facility: CLINIC | Age: 53
End: 2018-07-19

## 2018-07-19 NOTE — TELEPHONE ENCOUNTER
----- Message from Liala Whitney sent at 7/19/2018  9:19 AM CDT -----  Contact: [phone]/self  Pt states she's returning your call   Please call and advise

## 2018-07-21 ENCOUNTER — TELEPHONE (OUTPATIENT)
Dept: GASTROENTEROLOGY | Facility: CLINIC | Age: 53
End: 2018-07-21

## 2018-07-23 ENCOUNTER — TELEPHONE (OUTPATIENT)
Dept: GASTROENTEROLOGY | Facility: CLINIC | Age: 53
End: 2018-07-23

## 2018-07-23 NOTE — TELEPHONE ENCOUNTER
----- Message from Melisa Cindy sent at 7/23/2018  4:26 PM CDT -----  Contact: self, [phone]  Patient called in returning your call. Please advise.

## 2018-08-02 ENCOUNTER — TELEPHONE (OUTPATIENT)
Dept: GASTROENTEROLOGY | Facility: CLINIC | Age: 53
End: 2018-08-02

## 2018-08-03 NOTE — TELEPHONE ENCOUNTER
Patient discussed at Pancreatic Cyst Conference  Consensus: Follow up in pancreatic cyst clinic in 3 months

## 2018-09-11 ENCOUNTER — PATIENT MESSAGE (OUTPATIENT)
Dept: ENDOSCOPY | Facility: HOSPITAL | Age: 53
End: 2018-09-11

## 2018-10-15 ENCOUNTER — PATIENT MESSAGE (OUTPATIENT)
Dept: GASTROENTEROLOGY | Facility: CLINIC | Age: 53
End: 2018-10-15

## 2018-10-25 ENCOUNTER — OFFICE VISIT (OUTPATIENT)
Dept: GASTROENTEROLOGY | Facility: CLINIC | Age: 53
End: 2018-10-25
Payer: COMMERCIAL

## 2018-10-25 VITALS
HEART RATE: 77 BPM | HEIGHT: 61 IN | BODY MASS INDEX: 31.59 KG/M2 | WEIGHT: 167.31 LBS | DIASTOLIC BLOOD PRESSURE: 76 MMHG | SYSTOLIC BLOOD PRESSURE: 119 MMHG

## 2018-10-25 DIAGNOSIS — K86.2 PANCREAS CYST: ICD-10-CM

## 2018-10-25 DIAGNOSIS — K86.2 PANCREATIC CYST: ICD-10-CM

## 2018-10-25 PROCEDURE — 99999 PR PBB SHADOW E&M-EST. PATIENT-LVL III: CPT | Mod: PBBFAC,,, | Performed by: INTERNAL MEDICINE

## 2018-10-25 PROCEDURE — 3008F BODY MASS INDEX DOCD: CPT | Mod: CPTII,S$GLB,, | Performed by: INTERNAL MEDICINE

## 2018-10-25 PROCEDURE — 99214 OFFICE O/P EST MOD 30 MIN: CPT | Mod: S$GLB,,, | Performed by: INTERNAL MEDICINE

## 2018-10-25 NOTE — PROGRESS NOTES
Gastroenterology: [facility]      SUBJECTIVE:         Chief Complaint: Here for evaluation of a pancreatic cyst     History of Present Illness:  Patient is a 53 y.o. female presents with a pancreatic cyst. The cyst was first noted 2018 during a visit to the ED for biliary colic.  Subsequent to the abdominal us she had an EUS. It's located in the the tail.  It measures 23 by 25 mm  in size.  It is not symptomatic. Previous studies include only ultrasound and EUS.     Previous FNA of the cyst has been done.  CEA =404  Amylase=40,000    There is not a family history of pancreatic cancer     The patient does not smoke.    ECOG status 0 - Asymptomatic      (Not in a hospital admission)    Review of patient's allergies indicates:  No Known Allergies     Past Medical History:   Diagnosis Date    Hyperlipidemia     Hypertension      Past Surgical History:   Procedure Laterality Date     SECTION      EGD (ESOPHAGOGASTRODUODENOSCOPY) N/A 7/3/2018    Performed by Efren Whitley MD at [facility]    ENDOSCOPIC ULTRASOUND OF UPPER GASTROINTESTINAL TRACT N/A 7/3/2018    Procedure: ULTRASOUND, ENDOSCOPIC, UPPER GI TRACT;  Surgeon: Efren Whitley MD;  Location: [facility];  Service: Endoscopy;  Laterality: N/A;    ESOPHAGOGASTRODUODENOSCOPY N/A 7/3/2018    Procedure: EGD (ESOPHAGOGASTRODUODENOSCOPY);  Surgeon: Efren Whitley MD;  Location: [facility];  Service: Endoscopy;  Laterality: N/A;    ULTRASOUND, ENDOSCOPIC, UPPER GI TRACT N/A 7/3/2018    Performed by Efren Whitley MD at [facility]     Family History   Problem Relation Age of Onset    Breast cancer Neg Hx     Cancer Neg Hx     Colon cancer Neg Hx     Ovarian cancer Neg Hx     Esophageal cancer Neg Hx      Social History     Tobacco Use    Smoking status: Never Smoker    Smokeless tobacco: Never Used   Substance Use Topics    Alcohol use: No    Drug use: No       Review of Systems:  A complete review of  systems was performed and other than described in the HPI and below is negative       OBJECTIVE:     Vital Signs (Most Recent)  Pulse: 77 (10/25/18 0907)  BP: 119/76 (10/25/18 0907)        Diagnostic Results:  EUS      An anechoic lesion suggestive of a cyst was identified in the        pancreatic body. The lesion measured 24 mm by 23 mm in maximal        cross-sectional diameter. There was a single compartment without        septae. The outer wall of the lesion was thick. There was internal        debris within the fluid-filled cavity. Diagnostic needle aspiration        for fluid was performed.      ASSESSMENT/PLAN:     Pancreatic cyst in the the tail. Measuring 24 x 23 mm in size,  No follow up exam has been performed  Most likely etiology at this point is a undiffentited  Year 0  We discussed in detail the natural history of pancreatic cysts, the therapy involved, and the benefits of multimodality imaging including Ct, MRI, and EUS with FNA    Plan:   We will be discussing her case at the pancreactic cyst clinic multidisciplinary conference to finalize a treatment plan.Current ACR guidelines would suggest perform cross-sectional imaging 6 months from her last EUS, to occur in 12/2018    We spent 25 minute in today's clinic with greater than 50% of the time dedicated to counseling and arranging care.

## 2018-10-29 ENCOUNTER — TELEPHONE (OUTPATIENT)
Dept: ENDOSCOPY | Facility: HOSPITAL | Age: 53
End: 2018-10-29

## 2018-11-02 ENCOUNTER — TELEPHONE (OUTPATIENT)
Dept: ENDOSCOPY | Facility: HOSPITAL | Age: 53
End: 2018-11-02

## 2018-12-04 ENCOUNTER — HOSPITAL ENCOUNTER (OUTPATIENT)
Dept: RADIOLOGY | Facility: HOSPITAL | Age: 53
Discharge: HOME | End: 2018-12-04
Attending: INTERNAL MEDICINE
Payer: COMMERCIAL

## 2018-12-04 DIAGNOSIS — K86.2 PANCREATIC CYST: ICD-10-CM

## 2018-12-04 PROCEDURE — 74183 MRI ABD W/O CNTR FLWD CNTR: CPT | Mod: 26,,, | Performed by: RADIOLOGY

## 2018-12-04 PROCEDURE — 74183 MRI ABD W/O CNTR FLWD CNTR: CPT | Mod: TC

## 2018-12-04 PROCEDURE — A9585 GADOBUTROL INJECTION: HCPCS | Performed by: INTERNAL MEDICINE

## 2018-12-04 PROCEDURE — 25500020 PHARM REV CODE 255: Performed by: INTERNAL MEDICINE

## 2018-12-04 RX ORDER — GADOBUTROL 604.72 MG/ML
10 INJECTION INTRAVENOUS
Status: COMPLETED | OUTPATIENT
Start: 2018-12-04 | End: 2018-12-04

## 2018-12-04 RX ADMIN — GADOBUTROL 10 ML: 604.72 INJECTION INTRAVENOUS at 10:12

## 2018-12-06 ENCOUNTER — TELEPHONE (OUTPATIENT)
Dept: ENDOSCOPY | Facility: HOSPITAL | Age: 53
End: 2018-12-06

## 2018-12-06 NOTE — TELEPHONE ENCOUNTER
----- Message from Maged Cedillo MD sent at 12/6/2018  8:04 AM CST -----  Can you let her know her cyst hasn't changes in size. We'll need to do an EUS in May 2019

## 2018-12-06 NOTE — TELEPHONE ENCOUNTER
----- Message from Kathryn Aguilar sent at 12/6/2018  2:42 PM CST -----  Contact: self [phone]  Mamadou - returning your call - please call patient at [phone]

## 2018-12-06 NOTE — TELEPHONE ENCOUNTER
----- Message from Maged Cedillo MD sent at 12/6/2018  8:04 AM CST -----  Can you let her know her cyst hasn't changes in size. We'll need to do an EUS in May 2019      Patient informed.

## 2018-12-15 ENCOUNTER — HOSPITAL ENCOUNTER (EMERGENCY)
Facility: HOSPITAL | Age: 53
Discharge: LEFT BEFORE DISCHARGE | End: 2018-12-15
Attending: EMERGENCY MEDICINE
Payer: COMMERCIAL

## 2018-12-15 VITALS
RESPIRATION RATE: 14 BRPM | HEIGHT: 60 IN | DIASTOLIC BLOOD PRESSURE: 77 MMHG | SYSTOLIC BLOOD PRESSURE: 142 MMHG | OXYGEN SATURATION: 95 % | TEMPERATURE: 99 F | WEIGHT: 165 LBS | HEART RATE: 79 BPM | BODY MASS INDEX: 32.39 KG/M2

## 2018-12-15 DIAGNOSIS — R10.9 ABDOMINAL PAIN: ICD-10-CM

## 2018-12-15 DIAGNOSIS — K85.90 ACUTE PANCREATITIS, UNSPECIFIED COMPLICATION STATUS, UNSPECIFIED PANCREATITIS TYPE: Primary | ICD-10-CM

## 2018-12-15 LAB
ALBUMIN SERPL BCP-MCNC: 4 G/DL
ALP SERPL-CCNC: 116 U/L
ALT SERPL W/O P-5'-P-CCNC: 33 U/L
ANION GAP SERPL CALC-SCNC: 15 MMOL/L
AST SERPL-CCNC: 25 U/L
BASOPHILS # BLD AUTO: 0.03 K/UL
BASOPHILS NFR BLD: 0.1 %
BILIRUB SERPL-MCNC: 0.4 MG/DL
BUN SERPL-MCNC: 13 MG/DL
CALCIUM SERPL-MCNC: 9.8 MG/DL
CHLORIDE SERPL-SCNC: 106 MMOL/L
CO2 SERPL-SCNC: 20 MMOL/L
CREAT SERPL-MCNC: 0.7 MG/DL
DIFFERENTIAL METHOD: ABNORMAL
EOSINOPHIL # BLD AUTO: 0 K/UL
EOSINOPHIL NFR BLD: 0.1 %
ERYTHROCYTE [DISTWIDTH] IN BLOOD BY AUTOMATED COUNT: 13.4 %
EST. GFR  (AFRICAN AMERICAN): >60 ML/MIN/1.73 M^2
EST. GFR  (NON AFRICAN AMERICAN): >60 ML/MIN/1.73 M^2
GLUCOSE SERPL-MCNC: 130 MG/DL
HCT VFR BLD AUTO: 45.1 %
HGB BLD-MCNC: 14.5 G/DL
LIPASE SERPL-CCNC: >1000 U/L
LYMPHOCYTES # BLD AUTO: 2.9 K/UL
LYMPHOCYTES NFR BLD: 13.4 %
MCH RBC QN AUTO: 29.1 PG
MCHC RBC AUTO-ENTMCNC: 32.2 G/DL
MCV RBC AUTO: 91 FL
MONOCYTES # BLD AUTO: 0.8 K/UL
MONOCYTES NFR BLD: 3.8 %
NEUTROPHILS # BLD AUTO: 17.7 K/UL
NEUTROPHILS NFR BLD: 82.1 %
PLATELET # BLD AUTO: 291 K/UL
PMV BLD AUTO: 10.1 FL
POTASSIUM SERPL-SCNC: 3.8 MMOL/L
PROT SERPL-MCNC: 8.5 G/DL
RBC # BLD AUTO: 4.98 M/UL
SODIUM SERPL-SCNC: 141 MMOL/L
WBC # BLD AUTO: 21.53 K/UL

## 2018-12-15 PROCEDURE — 25000003 PHARM REV CODE 250: Performed by: PHYSICIAN ASSISTANT

## 2018-12-15 PROCEDURE — 96375 TX/PRO/DX INJ NEW DRUG ADDON: CPT

## 2018-12-15 PROCEDURE — 25500020 PHARM REV CODE 255: Performed by: EMERGENCY MEDICINE

## 2018-12-15 PROCEDURE — 80053 COMPREHEN METABOLIC PANEL: CPT

## 2018-12-15 PROCEDURE — 85025 COMPLETE CBC W/AUTO DIFF WBC: CPT

## 2018-12-15 PROCEDURE — 83690 ASSAY OF LIPASE: CPT

## 2018-12-15 PROCEDURE — 99285 EMERGENCY DEPT VISIT HI MDM: CPT | Mod: 25

## 2018-12-15 PROCEDURE — 96376 TX/PRO/DX INJ SAME DRUG ADON: CPT

## 2018-12-15 PROCEDURE — 96361 HYDRATE IV INFUSION ADD-ON: CPT

## 2018-12-15 PROCEDURE — 63600175 PHARM REV CODE 636 W HCPCS: Performed by: PHYSICIAN ASSISTANT

## 2018-12-15 PROCEDURE — 96365 THER/PROPH/DIAG IV INF INIT: CPT

## 2018-12-15 PROCEDURE — 63600175 PHARM REV CODE 636 W HCPCS: Performed by: EMERGENCY MEDICINE

## 2018-12-15 PROCEDURE — 25000003 PHARM REV CODE 250: Performed by: EMERGENCY MEDICINE

## 2018-12-15 RX ORDER — ONDANSETRON 2 MG/ML
4 INJECTION INTRAMUSCULAR; INTRAVENOUS
Status: COMPLETED | OUTPATIENT
Start: 2018-12-15 | End: 2018-12-15

## 2018-12-15 RX ORDER — MORPHINE SULFATE 2 MG/ML
2 INJECTION, SOLUTION INTRAMUSCULAR; INTRAVENOUS
Status: COMPLETED | OUTPATIENT
Start: 2018-12-15 | End: 2018-12-15

## 2018-12-15 RX ORDER — MORPHINE SULFATE 4 MG/ML
4 INJECTION, SOLUTION INTRAMUSCULAR; INTRAVENOUS
Status: COMPLETED | OUTPATIENT
Start: 2018-12-15 | End: 2018-12-15

## 2018-12-15 RX ADMIN — IOHEXOL 100 ML: 350 INJECTION, SOLUTION INTRAVENOUS at 03:12

## 2018-12-15 RX ADMIN — PIPERACILLIN AND TAZOBACTAM 4.5 G: 4; .5 INJECTION, POWDER, LYOPHILIZED, FOR SOLUTION INTRAVENOUS; PARENTERAL at 03:12

## 2018-12-15 RX ADMIN — ONDANSETRON 4 MG: 2 INJECTION INTRAMUSCULAR; INTRAVENOUS at 04:12

## 2018-12-15 RX ADMIN — SODIUM CHLORIDE 1000 ML: 0.9 INJECTION, SOLUTION INTRAVENOUS at 02:12

## 2018-12-15 RX ADMIN — MORPHINE SULFATE 2 MG: 4 INJECTION INTRAVENOUS at 04:12

## 2018-12-15 RX ADMIN — MORPHINE SULFATE 2 MG: 2 INJECTION, SOLUTION INTRAMUSCULAR; INTRAVENOUS at 02:12

## 2018-12-15 RX ADMIN — ONDANSETRON 4 MG: 2 INJECTION INTRAMUSCULAR; INTRAVENOUS at 02:12

## 2018-12-15 RX ADMIN — ONDANSETRON 4 MG: 2 INJECTION INTRAMUSCULAR; INTRAVENOUS at 06:12

## 2018-12-15 NOTE — ED NOTES
APPEARANCE: Alert, oriented and in no acute distress.  CARDIAC: Normal rate and rhythm, no murmur heard. BLOOD PRESSURE ELEVATED  PERIPHERAL VASCULAR: peripheral pulses present. Normal cap refill. No edema. Warm to touch.    RESPIRATORY:Normal rate and effort, breath sounds clear bilaterally throughout chest. Respirations are equal and unlabored no obvious signs of distress.  GASTRO: C/O LLQ ABDOMINAL PAIN 10/10 TENDERNESS UPON PALPATION.  MUSC: Full ROM. No bony tenderness or soft tissue tenderness. No obvious deformity.  SKIN: Skin is warm and dry, normal skin turgor, mucous membranes moist.  NEURO: 5/5 strength major flexors/extensors bilaterally. Sensory intact to light touch bilaterally. [place] coma scale: eyes open spontaneously-4, oriented & converses-5, obeys commands-6. No neurological abnormalities.   MENTAL STATUS: awake, alert and aware of environment.  EYE: PERRL, both eyes: pupils brisk and reactive to light. Normal size.  ENT: EARS: no obvious drainage. NOSE: no active bleeding.

## 2018-12-15 NOTE — ED NOTES
Patient presents to ED with c/o abdominal pain to the LLQ with nausea and vomiting since this morning.

## 2018-12-16 NOTE — PLAN OF CARE
[facility] was consulted for a patient in the ER concerning for pancreatitis    Brief history: Patient was diagnosed with pancreatitic cyst in 06/2018. Biopsy in 07/2018  Confirmed cystic nature of the lesion and was instructed to continued follow up with GI. Patient was scheduled for EUS in 12/2018. On 12/4/18 patient underwent MRI which confirmed pancreatitic lesion (unchanged) with cholecystitis     This morning, patient awoke with left sided abdominal pain which was associated with nausea and NBNB emesis. Patient was taken to [facility] ER by son and sister. CT abdomen was significant for pancreatitis with concern for pancreatitic necrosis not being able to be ruled out. Lab work revealed leucocytosis with Lipase >1000. ER physician, Dr. Hillman, contacted GI who advised for the patient to come in for medical management with plan for EUS on 12/17. Upon [facility] evaluation, plan was explained to son and sister who expressed frustration of something not being done today. They expressed their desire to be transferred to [facility]. Risks and benefits were discussed including risk of clinical worsening if not admitted for treatment of pancreatitis with concern for pancreatic necrosis. Family insisted again to be transferred to another facility. Patient and family will leave AMA and will sign paperwork in ER    Christopher Garcia, DO   Internal Medicine/Pediatrics [facility]

## 2019-01-14 PROBLEM — K80.10 CALCULUS OF GALLBLADDER WITH CHRONIC CHOLECYSTITIS WITHOUT OBSTRUCTION: Status: ACTIVE | Noted: 2018-06-18

## 2019-03-18 PROBLEM — Z90.49 S/P LAPAROSCOPIC CHOLECYSTECTOMY: Status: ACTIVE | Noted: 2019-03-18

## 2019-04-05 ENCOUNTER — TELEPHONE (OUTPATIENT)
Dept: ENDOSCOPY | Facility: HOSPITAL | Age: 54
End: 2019-04-05

## 2019-04-05 DIAGNOSIS — K86.2 CYST AND PSEUDOCYST OF PANCREAS: Primary | ICD-10-CM

## 2019-04-05 DIAGNOSIS — K86.3 CYST AND PSEUDOCYST OF PANCREAS: Primary | ICD-10-CM

## 2019-04-17 ENCOUNTER — TELEPHONE (OUTPATIENT)
Dept: GASTROENTEROLOGY | Facility: CLINIC | Age: 54
End: 2019-04-17

## 2019-05-02 ENCOUNTER — TELEPHONE (OUTPATIENT)
Dept: GASTROENTEROLOGY | Facility: CLINIC | Age: 54
End: 2019-05-02

## 2019-05-02 NOTE — TELEPHONE ENCOUNTER
Patient is questioning the need for repeat EUS this month. She believed she was only getting a MRI.

## 2019-05-14 ENCOUNTER — TELEPHONE (OUTPATIENT)
Dept: GASTROENTEROLOGY | Facility: CLINIC | Age: 54
End: 2019-05-14

## 2019-05-14 NOTE — TELEPHONE ENCOUNTER
----- Message from Rhina Agosto sent at 5/14/2019  3:32 PM CDT -----  Contact: [phone]/self  Patient is requesting a call back regarding cancelling her procedure. Please call her. Thanks

## 2019-06-18 ENCOUNTER — TELEPHONE (OUTPATIENT)
Dept: GASTROENTEROLOGY | Facility: CLINIC | Age: 54
End: 2019-06-18

## 2019-07-16 ENCOUNTER — TELEPHONE (OUTPATIENT)
Dept: GASTROENTEROLOGY | Facility: CLINIC | Age: 54
End: 2019-07-16

## 2019-07-16 NOTE — TELEPHONE ENCOUNTER
Spoke with pt and she is waiting to have a Colonoscopy at [facility] on 8/8/19 before she has the UEUS procedure. Patient said that she would call the clinic back to schedule when she is ready.

## 2019-07-31 ENCOUNTER — TELEPHONE (OUTPATIENT)
Dept: GASTROENTEROLOGY | Facility: CLINIC | Age: 54
End: 2019-07-31

## 2019-09-04 ENCOUNTER — TELEPHONE (OUTPATIENT)
Dept: GASTROENTEROLOGY | Facility: CLINIC | Age: 54
End: 2019-09-04

## 2019-09-04 DIAGNOSIS — K86.2 CYSTIC MASS OF PANCREAS: Primary | ICD-10-CM

## 2019-09-04 NOTE — TELEPHONE ENCOUNTER
EUS scheduled on 10/9/19 at 700am with Dr. Tray Whitley.  Location and instructions explained to patient. NPO after midnight.  Clear liquids after 700pm. Lab will call two days before with arrival time. Must have transportation due to sedation. Verbalized understanding.

## 2019-10-01 ENCOUNTER — TELEPHONE (OUTPATIENT)
Dept: ENDOSCOPY | Facility: HOSPITAL | Age: 54
End: 2019-10-01

## 2019-10-09 ENCOUNTER — HOSPITAL ENCOUNTER (OUTPATIENT)
Facility: HOSPITAL | Age: 54
Discharge: HOME | End: 2019-10-09
Attending: INTERNAL MEDICINE | Admitting: INTERNAL MEDICINE
Payer: MEDICAID

## 2019-10-09 ENCOUNTER — ANESTHESIA (OUTPATIENT)
Dept: ENDOSCOPY | Facility: HOSPITAL | Age: 54
End: 2019-10-09
Payer: MEDICAID

## 2019-10-09 ENCOUNTER — ANESTHESIA EVENT (OUTPATIENT)
Dept: ENDOSCOPY | Facility: HOSPITAL | Age: 54
End: 2019-10-09
Payer: MEDICAID

## 2019-10-09 VITALS
SYSTOLIC BLOOD PRESSURE: 132 MMHG | TEMPERATURE: 98 F | RESPIRATION RATE: 19 BRPM | BODY MASS INDEX: 32.1 KG/M2 | HEIGHT: 61 IN | HEART RATE: 68 BPM | WEIGHT: 170 LBS | DIASTOLIC BLOOD PRESSURE: 74 MMHG | OXYGEN SATURATION: 100 %

## 2019-10-09 DIAGNOSIS — K86.2 PANCREAS CYST: ICD-10-CM

## 2019-10-09 PROCEDURE — 88173 CYTOLOGY SPECIMEN- FNA RADIOLOGY GUIDED, BRONCH/EBUS, EUS/GI: ICD-10-PCS | Mod: 26,,, | Performed by: PATHOLOGY

## 2019-10-09 PROCEDURE — D9220A PRA ANESTHESIA: Mod: CRNA,,, | Performed by: NURSE ANESTHETIST, CERTIFIED REGISTERED

## 2019-10-09 PROCEDURE — 63600175 PHARM REV CODE 636 W HCPCS: Performed by: NURSE ANESTHETIST, CERTIFIED REGISTERED

## 2019-10-09 PROCEDURE — 43242 EGD US FINE NEEDLE BX/ASPIR: CPT | Mod: ,,, | Performed by: INTERNAL MEDICINE

## 2019-10-09 PROCEDURE — D9220A PRA ANESTHESIA: Mod: ANES,,, | Performed by: ANESTHESIOLOGY

## 2019-10-09 PROCEDURE — 88173 CYTOPATH EVAL FNA REPORT: CPT | Mod: 26,,, | Performed by: PATHOLOGY

## 2019-10-09 PROCEDURE — D9220A PRA ANESTHESIA: ICD-10-PCS | Mod: ANES,,, | Performed by: ANESTHESIOLOGY

## 2019-10-09 PROCEDURE — 88172 CYTP DX EVAL FNA 1ST EA SITE: CPT | Mod: 26,,, | Performed by: PATHOLOGY

## 2019-10-09 PROCEDURE — 43242 PR UPGI ENDOSCOPY,FN NEEDLE BX,GUIDED: ICD-10-PCS | Mod: ,,, | Performed by: INTERNAL MEDICINE

## 2019-10-09 PROCEDURE — 88172 CYTOLOGY SPECIMEN- FNA RADIOLOGY GUIDED, BRONCH/EBUS, EUS/GI: ICD-10-PCS | Mod: 26,,, | Performed by: PATHOLOGY

## 2019-10-09 PROCEDURE — 37000008 HC ANESTHESIA 1ST 15 MINUTES: Performed by: INTERNAL MEDICINE

## 2019-10-09 PROCEDURE — 63600175 PHARM REV CODE 636 W HCPCS: Performed by: INTERNAL MEDICINE

## 2019-10-09 PROCEDURE — 27202059 HC NEEDLE, FNA (ANY): Performed by: INTERNAL MEDICINE

## 2019-10-09 PROCEDURE — 88305 TISSUE EXAM BY PATHOLOGIST: CPT | Performed by: PATHOLOGY

## 2019-10-09 PROCEDURE — 43242 EGD US FINE NEEDLE BX/ASPIR: CPT | Performed by: INTERNAL MEDICINE

## 2019-10-09 PROCEDURE — D9220A PRA ANESTHESIA: ICD-10-PCS | Mod: CRNA,,, | Performed by: NURSE ANESTHETIST, CERTIFIED REGISTERED

## 2019-10-09 PROCEDURE — 88305 TISSUE EXAM BY PATHOLOGIST: CPT | Mod: 26,,, | Performed by: PATHOLOGY

## 2019-10-09 PROCEDURE — 88305 CYTOLOGY SPECIMEN- FNA RADIOLOGY GUIDED, BRONCH/EBUS, EUS/GI: ICD-10-PCS | Mod: 26,,, | Performed by: PATHOLOGY

## 2019-10-09 PROCEDURE — 37000009 HC ANESTHESIA EA ADD 15 MINS: Performed by: INTERNAL MEDICINE

## 2019-10-09 PROCEDURE — 00731 ANES UPR GI NDSC PX NOS: CPT | Performed by: INTERNAL MEDICINE

## 2019-10-09 RX ORDER — ONDANSETRON 2 MG/ML
4 INJECTION INTRAMUSCULAR; INTRAVENOUS ONCE AS NEEDED
Status: DISCONTINUED | OUTPATIENT
Start: 2019-10-09 | End: 2019-10-09 | Stop reason: HOSPADM

## 2019-10-09 RX ORDER — PROPOFOL 10 MG/ML
VIAL (ML) INTRAVENOUS
Status: DISCONTINUED | OUTPATIENT
Start: 2019-10-09 | End: 2019-10-09

## 2019-10-09 RX ORDER — MIDAZOLAM HYDROCHLORIDE 1 MG/ML
INJECTION, SOLUTION INTRAMUSCULAR; INTRAVENOUS
Status: DISCONTINUED | OUTPATIENT
Start: 2019-10-09 | End: 2019-10-09

## 2019-10-09 RX ORDER — SODIUM CHLORIDE 0.9 % (FLUSH) 0.9 %
10 SYRINGE (ML) INJECTION
Status: DISCONTINUED | OUTPATIENT
Start: 2019-10-09 | End: 2019-10-09 | Stop reason: HOSPADM

## 2019-10-09 RX ORDER — SODIUM CHLORIDE 9 MG/ML
INJECTION, SOLUTION INTRAVENOUS CONTINUOUS
Status: DISCONTINUED | OUTPATIENT
Start: 2019-10-09 | End: 2019-10-09 | Stop reason: HOSPADM

## 2019-10-09 RX ORDER — LIDOCAINE HCL/PF 100 MG/5ML
SYRINGE (ML) INTRAVENOUS
Status: DISCONTINUED | OUTPATIENT
Start: 2019-10-09 | End: 2019-10-09

## 2019-10-09 RX ORDER — PROPOFOL 10 MG/ML
VIAL (ML) INTRAVENOUS CONTINUOUS PRN
Status: DISCONTINUED | OUTPATIENT
Start: 2019-10-09 | End: 2019-10-09

## 2019-10-09 RX ORDER — CIPROFLOXACIN 2 MG/ML
INJECTION, SOLUTION INTRAVENOUS
Status: DISCONTINUED | OUTPATIENT
Start: 2019-10-09 | End: 2019-10-09

## 2019-10-09 RX ADMIN — MIDAZOLAM HYDROCHLORIDE 2 MG: 1 INJECTION, SOLUTION INTRAMUSCULAR; INTRAVENOUS at 01:10

## 2019-10-09 RX ADMIN — PROPOFOL 70 MG: 10 INJECTION, EMULSION INTRAVENOUS at 01:10

## 2019-10-09 RX ADMIN — LIDOCAINE HYDROCHLORIDE 75 MG: 20 INJECTION, SOLUTION INTRAVENOUS at 01:10

## 2019-10-09 RX ADMIN — CIPROFLOXACIN 400 MG: 2 INJECTION, SOLUTION INTRAVENOUS at 01:10

## 2019-10-09 RX ADMIN — PROPOFOL 150 MCG/KG/MIN: 10 INJECTION, EMULSION INTRAVENOUS at 01:10

## 2019-10-09 RX ADMIN — PROPOFOL 30 MG: 10 INJECTION, EMULSION INTRAVENOUS at 01:10

## 2019-10-09 RX ADMIN — SODIUM CHLORIDE: 0.9 INJECTION, SOLUTION INTRAVENOUS at 12:10

## 2019-10-09 NOTE — ANESTHESIA POSTPROCEDURE EVALUATION
Anesthesia Post Evaluation    Patient: Savannah Guerrero    Procedure(s) Performed: Procedure(s) (LRB):  ULTRASOUND, UPPER GI TRACT, ENDOSCOPIC (N/A)    Final Anesthesia Type: general  Patient location during evaluation: [facility]  Patient participation: Yes- Able to Participate  Level of consciousness: awake and alert and oriented  Post-procedure vital signs: reviewed and stable  Pain management: adequate  Airway patency: patent  PONV status at discharge: No PONV  Anesthetic complications: no      Cardiovascular status: blood pressure returned to baseline and hemodynamically stable  Respiratory status: unassisted, spontaneous ventilation and room air  Hydration status: euvolemic  Follow-up not needed.          Vitals Value Taken Time   /74 10/9/2019  3:02 PM   Temp 36.5 °C (97.7 °F) 10/9/2019  3:00 PM   Pulse 69 10/9/2019  3:03 PM   Resp 67 10/9/2019  3:03 PM   SpO2 99 % 10/9/2019  3:02 PM   Vitals shown include unvalidated device data.      No case tracking events are documented in the log.      Pain/Rohan Score: Rohan Score: 10 (10/9/2019  3:00 PM)

## 2019-10-09 NOTE — [employer] PATIENT INSTRUCTIONS
Discharge Summary/Instructions after an Endoscopic Procedure  Patient Name: Savannah Guerrero  Patient MRN: [id number]  Patient YOB: 1965 Wednesday, October 09, 2019  Maged Cedillo MD  RESTRICTIONS:  During your procedure today, you received medications for sedation.  These   medications may affect your judgment, balance and coordination.  Therefore,   for 24 hours, you have the following restrictions:   - DO NOT drive a car, operate machinery, make legal/financial decisions,   sign important papers or drink alcohol.    ACTIVITY:  Today: no heavy lifting, straining or running due to procedural   sedation/anesthesia.  The following day: return to full activity including work.  DIET:  Eat and drink normally unless instructed otherwise.     TREATMENT FOR COMMON SIDE EFFECTS:  - Mild abdominal pain, nausea, belching, bloating or excessive gas:  rest,   eat lightly and use a heating pad.  - Sore Throat: treat with throat lozenges and/or gargle with warm salt   water.  - Because air was used during the procedure, expelling large amounts of air   from your rectum or belching is normal.  - If a bowel prep was taken, you may not have a bowel movement for 1-3 days.    This is normal.  SYMPTOMS TO WATCH FOR AND REPORT TO YOUR PHYSICIAN:  1. Abdominal pain or bloating, other than gas cramps.  2. Chest pain.  3. Back pain.  4. Signs of infection such as: chills or fever occurring within 24 hours   after the procedure.  5. Rectal bleeding, which would show as bright red, maroon, or black stools.   (A tablespoon of blood from the rectum is not serious, especially if   hemorrhoids are present.)  6. Vomiting.  7. Weakness or dizziness.  GO DIRECTLY TO THE NEAREST EMERGENCY ROOM IF YOU HAVE ANY OF THE FOLLOWING:      Difficulty breathing              Chills and/or fever over 101 F   Persistent vomiting and/or vomiting blood   Severe abdominal pain   Severe chest pain   Black, tarry stools   Bleeding- more than one  tablespoon   Any other symptom or condition that you feel may need urgent attention  Your doctor recommends these additional instructions:  If any biopsies were taken, your doctors clinic will contact you in 1 to 2   weeks with any results.  - Discharge patient to home.   - Resume previous diet.   - Continue present medications.   - Await path results.  For questions, problems or results please call your physician - Maged Cedillo MD at Work:  [phone].  [facility], EMERGENCY ROOM PHONE NUMBER: [phone]  IF A COMPLICATION OR EMERGENCY SITUATION ARISES AND YOU ARE UNABLE TO REACH   YOUR PHYSICIAN - GO DIRECTLY TO THE EMERGENCY ROOM.  Maged Cedillo MD  10/9/2019 2:07:31 PM  This report has been verified and signed electronically.  [employer]

## 2019-10-09 NOTE — H&P
Short Stay Endoscopy History and Physical    PCP - Rosy Rodriguez MD  Referring Physician - Efren Whitley MD  [address] [place] [address]    Procedure - eus  ASA - per anesthesia  Mallampati - per anesthesia  History of Anesthesia problems - no  Family history Anesthesia problems -  no   Plan of anesthesia - General    HPI:  This is a 54 y.o. female here for evaluation of: pancreas cyst    Reflux - no  Dysphagia - no  Abdominal pain - no  Diarrhea - no    ROS:  Constitutional: No fevers, chills, No weight loss  CV: No chest pain  Pulm: No cough, No shortness of breath  Ophtho: No vision changes  GI: see HPI  Derm: No rash    Medical History:  has a past medical history of Hyperlipidemia and Hypertension.    Surgical History:  has a past surgical history that includes  section (); Endoscopic ultrasound of upper gastrointestinal tract (N/A, 7/3/2018); and Esophagogastroduodenoscopy (N/A, 7/3/2018).    Family History: family history is not on file..    Social History:  reports that she has never smoked. She has never used smokeless tobacco. She reports that she does not drink alcohol or use drugs.    Review of patient's allergies indicates:  No Known Allergies    Medications:   Medications Prior to Admission   Medication Sig Dispense Refill Last Dose    losartan (COZAAR) 100 MG tablet Take by mouth.   10/8/2019 at Unknown time    OMEPRAZOLE ORAL Take by mouth.   Past Month at Unknown time    rosuvastatin (CRESTOR) 20 MG tablet Take by mouth.   10/8/2019 at Unknown time    FLUZONE QUAD 8504-0337, PF, 60 mcg (15 mcg x 4)/0.5 mL Syrg    Not Taking       Physical Exam:    Vital Signs:   Vitals:    10/09/19 1213   BP: (!) 148/72   Pulse: 68   Resp: 16   Temp: 97.9 °F (36.6 °C)       General Appearance: Well appearing in no acute distress    Labs:  Lab Results   Component Value Date    WBC 21.53 (H) 12/15/2018    HGB 14.5 12/15/2018    HCT 45.1 12/15/2018     12/15/2018    ALT  33 12/15/2018    AST 25 12/15/2018     12/15/2018    K 3.8 12/15/2018     12/15/2018    CREATININE 0.7 12/15/2018    BUN 13 12/15/2018    CO2 20 (L) 12/15/2018       I have explained the risks and benefits of this endoscopic procedure to the patient including but not limited to bleeding, inflammation, infection, perforation, and death.      Maged Cedillo MD

## 2019-10-09 NOTE — TRANSFER OF CARE
"Anesthesia Transfer of Care Note    Patient: Savannah Guerrero    Procedure(s) Performed: Procedure(s) (LRB):  ULTRASOUND, UPPER GI TRACT, ENDOSCOPIC (N/A)    Patient location: PACU    Anesthesia Type: general    Transport from OR: Transported from OR on room air with adequate spontaneous ventilation    Post pain: adequate analgesia    Post assessment: no apparent anesthetic complications and tolerated procedure well    Post vital signs: stable    Level of consciousness: awake    Nausea/Vomiting: no nausea/vomiting    Complications: none    Transfer of care protocol was followed      Last vitals:   Visit Vitals  /60 (BP Location: Left arm, Patient Position: Lying)   Pulse 85   Temp 36.6 °C (97.9 °F) (Temporal)   Resp (!) 21   Ht 5' 1" (1.549 m)   Wt 77.1 kg (170 lb)   SpO2 97%   Breastfeeding? No   BMI 32.12 kg/m²     "

## 2019-10-09 NOTE — PLAN OF CARE
Discharge instructions given and explained to patient and family with verbalization of understanding all instructions. Patients v/s stable, denies n/v and tolerating po, rates pain level tolerable, IV removed, and sister at bedside for patient discharge home.

## 2019-10-09 NOTE — DISCHARGE INSTRUCTIONS
Endoscopic Ultrasound (EUS)    An endoscopic ultrasound (EUS) is a test to look at the inside of your gastrointestinal (GI) tract. It's commonly used to look for cancers or growths in the esophagus, stomach, pancreas, liver, and rectum. It can help to stage cancer (see how advanced a cancer is). EUS may also be used to help diagnose certain diseases or to drain cysts or abscesses.  What is EUS?  EUS shows both ultrasound images and live video of the GI tract. During the test, a flexible tube called an endoscope (scope) is used. At the end of the scope is a tiny video camera and light. The video camera sends live images to a monitor. The scope also contains a very small ultrasound device. This uses sound waves to create images and send them to a monitor.  A needle is passed through the scope. The needle can be used take a small sample of tissue for testing. This is called a biopsy. The needle can be used to take a sample of fluid. This is called fine-needle aspiration (FNA).  Risks and possible complications of EUS  Risks and possible complications include the following:  · Bleeding  · Infection  · A perforation (hole) in the digestive tract   · Risks of sedation or anesthesia   Before the test  Be prepared prior to the test:  · Tell your healthcare provider what medicine you take. This includes vitamins, herbs, and over-the-counter medicine. It also includes any blood thinners, such as warfarin, clopidogrel, ibuprofen, or daily aspirin. Ask your healthcare provider if you need to stop taking some or all of them before the test.  · You may be prescribed antibiotics to take before or after the test. This depends on the area being studied and what is done during the test. These medicines help prevent infection.  · Carefully follow the instructions for preparing for the test to make sure results are accurate. Instructions may include:  ¨ If youre having an EUS of the upper GI tract (esophagus, stomach, duodenum,  pancreas, liver):  § Do not eat or drink for 6 hours before the test.  ¨ If youre having an EUS of the lower GI tract (rectum):  § Before the test, do bowel prep as instructed to clean your rectum of stool. This may involve a clear liquid diet and using a laxative (liquid or pills) the night before the test. Or it may mean doing one or more enemas the morning of the test.  § Do not eat or drink for 6 hours before the test.  · Be sure to arrive on time at the facility. Bring your identification and health insurance card. Leave valuables at home. If you have them, bring X-rays or other test results with you.  Let the healthcare provider know  For your safety, tell the healthcare provider if you:  · Take insulin. Your dose may need to be changed on the day of your test.  · Are allergic to latex.  · Have any other allergies.  · Are taking blood thinners.   During the test  An endoscopic ultrasound usually takes place in a hospital. The procedure itself may take 1 to 2 hours. You will likely go home soon afterward. During the test:  · You lie on your left side on an exam table.  · An intravenous (IV) line will be put into a vein in your arm or hand. This line supplies fluids and medicines. To keep you comfortable during the test, you will be given a sedative medicine. This medicine prevents discomfort and will make you sleepy.  · If you are having an EUS of the upper GI tract, local anesthetic may be sprayed in your throat. This will help you be more comfortable as the healthcare provider inserts the scope. The healthcare provider then gently puts the flexible scope into your mouth or nose and down your throat.  · If youre having an EUS of the lower GI tract, the healthcare provider gently puts the flexible scope into your anus.  · During the test, the scope sends live video and ultrasound images from inside your body to nearby monitors. These are used to examine your GI tract. Specialized procedures, such as drainage,  are done as needed.  · The healthcare provider may discuss the results with you soon after the test. Biopsy results take several  days.  · In most cases, you can go home within a few hours of the test. When you leave the facility, have an adult family member or friend drive you, even if you don't feel that sleepy.  After the test  Here is what to expect after the test:  · You may feel tired from the sedative. This should wear off by the end of the day.  · If you had an upper digestive endoscopy, your throat may feel sore for a day or two. Over-the-counter sore throat lozenges and spray should help.  · You can eat and drink normally as soon as the test is done.  When to call the healthcare provider  Call your healthcare provider if you notice any of the following:  · Fever of 100.4°F (38.0°C) or higher, or as advised by your healthcare provider  · Shortness of breath  · Vomiting blood, blood in stool, or black stools  · Coughing or hoarse voice that wont go away   Date Last Reviewed: 7/1/2016  © 2337-8648 [employer]. [address] [place] [address]. All rights reserved. This information is not intended as a substitute for professional medical care. Always follow your healthcare professional's instructions.        Sedación para procedimiento (adulto)  A usted le kelly dado medicamentos intravenosos para sedarlo gilson chavez procedimiento de hoy. Es probable que le hayan dado un medicamento contra el dolor y otro para dormir. La mayor parte del efecto de estos medicamentos ya ha desaparecido, hipolito es posible que continúe teniendo somnolencia gilson las próximas 6-8 horas.  Cuidados en la casa  · Es importante que haya un adulto responsable a chavez lado gilson las próximas ocho horas para vigilar si se produce un empeoramiento de chavez estado.  · No tome medicamentos orales contra el dolor o para dormir gilson las próximas cuatro horas, ya que esto puede reaccionar con los medicamentos que le dieron  en el hospital y provocar sandra respuesta mucho más rachael que la habitual.  · No oneal nada de alcohol gilson las próximas 24 horas.  · No maneje ni opere maquinaria peligrosa, ni tampoco tome decisiones importantes de negocios o personales gilson las siguientes 24 horas.   Visita de control  Programe sandra visita de control con chavez médico o con akosua centro si no se siente kalina despierto y no ha recuperado chavez nivel normal de actividad en un plazo de doce horas.  ¿Cuándo debe buscar atención médica?  Llame de inmediato al proveedor de atención médica si ocurre cualquiera de las siguientes situaciones:  · Somnolencia (adormecimiento) que va en aumento  · Debilidad o mareo en aumento  · Vómito persistente  · Si no pueden despertarlo  Date Last Reviewed: 5/22/2014  © 2238-9615 [employer]. [address] [place] [address], [place] [address]. Todos los derechos reservados. Esta información no pretende sustituir la atención médica profesional. Sólo chavez médico puede diagnosticar y tratar un problema de gaby.

## 2019-10-09 NOTE — ANESTHESIA PREPROCEDURE EVALUATION
10/09/2019  Savannah Guerrero is a 54 y.o., female   Pre-operative evaluation for Procedure(s) (LRB):  ULTRASOUND, UPPER GI TRACT, ENDOSCOPIC (N/A)    Savannah Guerrero is a 54 y.o. female     Patient Active Problem List   Diagnosis    Calculus of gallbladder with chronic cholecystitis without obstruction    Cystic mass of pancreas    Pain of upper abdomen    Rash    Pancreas cyst    S/P laparoscopic cholecystectomy       Review of patient's allergies indicates:  No Known Allergies    No current facility-administered medications on file prior to encounter.      Current Outpatient Medications on File Prior to Encounter   Medication Sig Dispense Refill    losartan (COZAAR) 100 MG tablet Take by mouth.      OMEPRAZOLE ORAL Take by mouth.      rosuvastatin (CRESTOR) 20 MG tablet Take by mouth.      FLUZONE QUAD 7200-8706, PF, 60 mcg (15 mcg x 4)/0.5 mL Syrg          Past Surgical History:   Procedure Laterality Date     SECTION      ENDOSCOPIC ULTRASOUND OF UPPER GASTROINTESTINAL TRACT N/A 7/3/2018    Procedure: ULTRASOUND, ENDOSCOPIC, UPPER GI TRACT;  Surgeon: Efren Whitley MD;  Location: [facility];  Service: Endoscopy;  Laterality: N/A;    ESOPHAGOGASTRODUODENOSCOPY N/A 7/3/2018    Procedure: EGD (ESOPHAGOGASTRODUODENOSCOPY);  Surgeon: Efren Whitley MD;  Location: [facility];  Service: Endoscopy;  Laterality: N/A;         Anesthesia Evaluation    I have reviewed the Patient Summary Reports.     I have reviewed the Medications.     Review of Systems  Anesthesia Hx:  No problems with previous Anesthesia Denies Hx of Anesthetic complications  History of prior surgery of interest to airway management or planning: Denies Family Hx of Anesthesia complications.   Denies Personal Hx of Anesthesia complications.   Social:  No Alcohol Use, Non-Smoker     Hematology/Oncology:  Hematology Normal   Oncology Normal     EENT/Dental:EENT/Dental Normal   Cardiovascular:   Exercise tolerance: good Hypertension hyperlipidemia    Pulmonary:  Pulmonary Normal    Renal/:  Renal/ Normal     Hepatic/GI:   GERD Pancreatic cyst   Neurological:  Neurology Normal    Endocrine:  Endocrine Normal    Psych:  Psychiatric Normal           Physical Exam  General:  Well nourished    Airway/Jaw/Neck:  Airway Findings: Mouth Opening: Normal Tongue: Normal  General Airway Assessment: Adult, Average  Mallampati: III  TM Distance: Normal, at least 6 cm  Jaw/Neck Findings:  Neck ROM: Normal ROM      Dental:  Dental Findings: In tact   Chest/Lungs:  Chest/Lungs Findings: Normal Respiratory Rate, Clear to auscultation     Heart/Vascular:  Heart Findings: Rate: Normal  Rhythm: Regular Rhythm        Mental Status:  Mental Status Findings:  Alert and Oriented, Cooperative         Anesthesia Plan  Type of Anesthesia, risks & benefits discussed:  Anesthesia Type:  general  Patient's Preference:   Intra-op Monitoring Plan: standard ASA monitors  Intra-op Monitoring Plan Comments:   Post Op Pain Control Plan: multimodal analgesia  Post Op Pain Control Plan Comments:   Induction:   IV  Beta Blocker:  Patient is not currently on a Beta-Blocker (No further documentation required).       Informed Consent: Patient understands risks and agrees with Anesthesia plan.  Questions answered. Anesthesia consent signed with patient.  ASA Score: 2     Day of Surgery Review of History & Physical:    H&P update referred to the provider.         Ready For Surgery From Anesthesia Perspective.

## 2019-10-11 ENCOUNTER — PATIENT MESSAGE (OUTPATIENT)
Dept: GASTROENTEROLOGY | Facility: HOSPITAL | Age: 54
End: 2019-10-11

## 2020-07-19 ENCOUNTER — TELEPHONE (OUTPATIENT)
Dept: ENDOSCOPY | Facility: HOSPITAL | Age: 55
End: 2020-07-19

## 2020-07-19 DIAGNOSIS — K86.2 PANCREATIC CYST: ICD-10-CM

## 2020-08-18 ENCOUNTER — HOSPITAL ENCOUNTER (OUTPATIENT)
Dept: RADIOLOGY | Facility: HOSPITAL | Age: 55
Discharge: HOME | End: 2020-08-18
Attending: INTERNAL MEDICINE
Payer: MEDICAID

## 2020-08-18 DIAGNOSIS — K86.2 PANCREATIC CYST: ICD-10-CM

## 2020-08-18 LAB
CREAT SERPL-MCNC: 0.6 MG/DL (ref 0.5–1.4)
SAMPLE: NORMAL

## 2020-08-18 PROCEDURE — 74183 MRI ABD W/O CNTR FLWD CNTR: CPT | Mod: 26,,, | Performed by: RADIOLOGY

## 2020-08-18 PROCEDURE — 74183 MRI ABDOMEN WITH AND WO_INC MRCP: ICD-10-PCS | Mod: 26,,, | Performed by: RADIOLOGY

## 2020-08-18 PROCEDURE — 76376 MRI ABDOMEN WITH AND WO_INC MRCP: ICD-10-PCS | Mod: 26,,, | Performed by: RADIOLOGY

## 2020-08-18 PROCEDURE — 76376 3D RENDER W/INTRP POSTPROCES: CPT | Mod: TC

## 2020-08-18 PROCEDURE — A9585 GADOBUTROL INJECTION: HCPCS | Performed by: INTERNAL MEDICINE

## 2020-08-18 PROCEDURE — 76376 3D RENDER W/INTRP POSTPROCES: CPT | Mod: 26,,, | Performed by: RADIOLOGY

## 2020-08-18 PROCEDURE — 25500020 PHARM REV CODE 255: Performed by: INTERNAL MEDICINE

## 2020-08-18 RX ORDER — GADOBUTROL 604.72 MG/ML
10 INJECTION INTRAVENOUS
Status: COMPLETED | OUTPATIENT
Start: 2020-08-18 | End: 2020-08-18

## 2020-08-18 RX ADMIN — GADOBUTROL 10 ML: 604.72 INJECTION INTRAVENOUS at 01:08

## 2020-08-20 ENCOUNTER — TELEPHONE (OUTPATIENT)
Dept: GASTROENTEROLOGY | Facility: CLINIC | Age: 55
End: 2020-08-20

## 2020-08-20 DIAGNOSIS — K86.2 PANCREATIC CYST: Primary | ICD-10-CM

## 2020-08-20 DIAGNOSIS — Z01.812 PRE-PROCEDURE LAB EXAM: ICD-10-CM

## 2020-08-20 NOTE — TELEPHONE ENCOUNTER
The cyst appears to have completely resolved leaving a small nodule off the tail.  We should do an EUS to insure this is not a remnant net of solid neoplasm that caused the pancreatitis.  We'll call her to schedule

## 2020-08-21 ENCOUNTER — TELEPHONE (OUTPATIENT)
Dept: ENDOSCOPY | Facility: HOSPITAL | Age: 55
End: 2020-08-21

## 2020-08-21 DIAGNOSIS — K86.2 PANCREATIC CYST: Primary | ICD-10-CM

## 2020-08-21 NOTE — TELEPHONE ENCOUNTER
Spoke with patient about arrival time @ *.   Covid test =    NPO status reviewed: Patient must have nothing to eat after midnight.  Pt may have CLEAR liquids ONLY until completely NPO 3 hrs @ *    Medications: Do not take Insulin or oral diabetic medications the day of the procedure.  Take as prescribed: heart, seizure and blood pressure medication in the morning with a sip of water (less than an ounce).  Take any breathing medications and bring inhalers to hospital with you Leave all valuables and jewelry at home.     Wear comfortable clothes to procedure to change into hospital gown You cannot drive for 24 hours after your procedure because you will receive sedation for your procedure to make you comfortable.  A ride must be provided at discharge.

## 2020-08-21 NOTE — TELEPHONE ENCOUNTER
Left message instructing patient to call dept @ [phone] between 8am-4pm.    Arrival time to be given @ 0700  (Message sent via [facility] portal)

## 2020-08-22 ENCOUNTER — CLINICAL SUPPORT (OUTPATIENT)
Dept: URGENT CARE | Facility: CLINIC | Age: 55
End: 2020-08-22
Payer: MEDICAID

## 2020-08-22 VITALS — TEMPERATURE: 99 F

## 2020-08-22 DIAGNOSIS — Z01.812 PRE-PROCEDURE LAB EXAM: ICD-10-CM

## 2020-08-22 PROCEDURE — U0003 INFECTIOUS AGENT DETECTION BY NUCLEIC ACID (DNA OR RNA); SEVERE ACUTE RESPIRATORY SYNDROME CORONAVIRUS 2 (SARS-COV-2) (CORONAVIRUS DISEASE [COVID-19]), AMPLIFIED PROBE TECHNIQUE, MAKING USE OF HIGH THROUGHPUT TECHNOLOGIES AS DESCRIBED BY CMS-2020-01-R: HCPCS

## 2020-08-22 NOTE — PROGRESS NOTES
Subjective:       Patient ID: Savannah Guerrero is a 55 y.o. female.    Vitals:  vitals were not taken for this visit.     Chief Complaint: COVID-19 Concerns    HPI  ROS    Objective:      Physical Exam      Assessment:       1. Pre-procedure lab exam        Plan:         Pre-procedure lab exam  -     COVID-19 Routine Screening

## 2020-08-23 LAB — SARS-COV-2 RNA RESP QL NAA+PROBE: NOT DETECTED

## 2020-08-25 ENCOUNTER — HOSPITAL ENCOUNTER (OUTPATIENT)
Facility: HOSPITAL | Age: 55
Discharge: HOME | End: 2020-08-25
Attending: INTERNAL MEDICINE | Admitting: INTERNAL MEDICINE
Payer: MEDICAID

## 2020-08-25 ENCOUNTER — ANESTHESIA EVENT (OUTPATIENT)
Dept: ENDOSCOPY | Facility: HOSPITAL | Age: 55
End: 2020-08-25
Payer: MEDICAID

## 2020-08-25 ENCOUNTER — ANESTHESIA (OUTPATIENT)
Dept: ENDOSCOPY | Facility: HOSPITAL | Age: 55
End: 2020-08-25
Payer: MEDICAID

## 2020-08-25 VITALS
OXYGEN SATURATION: 95 % | SYSTOLIC BLOOD PRESSURE: 120 MMHG | WEIGHT: 175 LBS | HEIGHT: 61 IN | HEART RATE: 77 BPM | BODY MASS INDEX: 33.04 KG/M2 | RESPIRATION RATE: 24 BRPM | TEMPERATURE: 98 F | DIASTOLIC BLOOD PRESSURE: 76 MMHG

## 2020-08-25 DIAGNOSIS — K86.2 PANCREAS CYST: ICD-10-CM

## 2020-08-25 PROCEDURE — 25000003 PHARM REV CODE 250: Performed by: NURSE ANESTHETIST, CERTIFIED REGISTERED

## 2020-08-25 PROCEDURE — 25000003 PHARM REV CODE 250: Performed by: INTERNAL MEDICINE

## 2020-08-25 PROCEDURE — 37000009 HC ANESTHESIA EA ADD 15 MINS: Performed by: INTERNAL MEDICINE

## 2020-08-25 PROCEDURE — 63600175 PHARM REV CODE 636 W HCPCS: Performed by: NURSE ANESTHETIST, CERTIFIED REGISTERED

## 2020-08-25 PROCEDURE — 00731 ANES UPR GI NDSC PX NOS: CPT | Performed by: INTERNAL MEDICINE

## 2020-08-25 PROCEDURE — 37000008 HC ANESTHESIA 1ST 15 MINUTES: Performed by: INTERNAL MEDICINE

## 2020-08-25 PROCEDURE — 43259 EGD US EXAM DUODENUM/JEJUNUM: CPT | Performed by: INTERNAL MEDICINE

## 2020-08-25 PROCEDURE — 43259 PR ENDOSCOPIC ULTRASOUND EXAM: ICD-10-PCS | Mod: ,,, | Performed by: INTERNAL MEDICINE

## 2020-08-25 PROCEDURE — 43259 EGD US EXAM DUODENUM/JEJUNUM: CPT | Mod: ,,, | Performed by: INTERNAL MEDICINE

## 2020-08-25 RX ORDER — PROPOFOL 10 MG/ML
VIAL (ML) INTRAVENOUS CONTINUOUS PRN
Status: DISCONTINUED | OUTPATIENT
Start: 2020-08-25 | End: 2020-08-25

## 2020-08-25 RX ORDER — SODIUM CHLORIDE 0.9 % (FLUSH) 0.9 %
10 SYRINGE (ML) INJECTION
Status: DISCONTINUED | OUTPATIENT
Start: 2020-08-25 | End: 2020-08-25 | Stop reason: HOSPADM

## 2020-08-25 RX ORDER — GLYCOPYRROLATE 0.2 MG/ML
INJECTION INTRAMUSCULAR; INTRAVENOUS
Status: DISCONTINUED | OUTPATIENT
Start: 2020-08-25 | End: 2020-08-25

## 2020-08-25 RX ORDER — SODIUM CHLORIDE 9 MG/ML
INJECTION, SOLUTION INTRAVENOUS CONTINUOUS
Status: DISCONTINUED | OUTPATIENT
Start: 2020-08-25 | End: 2020-08-25 | Stop reason: HOSPADM

## 2020-08-25 RX ORDER — PROPOFOL 10 MG/ML
VIAL (ML) INTRAVENOUS
Status: DISCONTINUED | OUTPATIENT
Start: 2020-08-25 | End: 2020-08-25

## 2020-08-25 RX ADMIN — SODIUM CHLORIDE 20 ML/HR: 0.9 INJECTION, SOLUTION INTRAVENOUS at 07:08

## 2020-08-25 RX ADMIN — PROPOFOL 50 MG: 10 INJECTION, EMULSION INTRAVENOUS at 08:08

## 2020-08-25 RX ADMIN — PROPOFOL 150 MCG/KG/MIN: 10 INJECTION, EMULSION INTRAVENOUS at 08:08

## 2020-08-25 RX ADMIN — GLYCOPYRROLATE 0.2 MG: 0.2 INJECTION, SOLUTION INTRAMUSCULAR; INTRAVENOUS at 08:08

## 2020-08-25 NOTE — PLAN OF CARE
PIV discontinued with catheter intact. PIV insertion site clean, dry, and intact with no signs/symptoms of redness or swelling. Pressure dressing applied with gauze and coban. Instructed patient to keep dressing on for at least 20-30 minutes and she verbalized understanding. Patient VSS and no complaints of pain or discomfort noted at this time.Recovery complete. Patient recovered to baseline. Discharge instructions reviewed with patient.

## 2020-08-25 NOTE — [employer] PATIENT INSTRUCTIONS
Discharge Summary/Instructions after an Endoscopic Procedure  Patient Name: Savannah Guerrero  Patient MRN: [id number]  Patient YOB: 1965 Tuesday, August 25, 2020  Maged Cedillo MD  Your health is very important to us during the Covid Crisis. Following your   procedure today, you will receive a daily text for 2 weeks asking about   signs or symptoms of Covid 19.  Please respond to this text when you   receive it so we can follow up and keep you as safe as possible.   RESTRICTIONS:  During your procedure today, you received medications for sedation.  These   medications may affect your judgment, balance and coordination.  Therefore,   for 24 hours, you have the following restrictions:   - DO NOT drive a car, operate machinery, make legal/financial decisions,   sign important papers or drink alcohol.    ACTIVITY:  Today: no heavy lifting, straining or running due to procedural   sedation/anesthesia.  The following day: return to full activity including work.  DIET:  Eat and drink normally unless instructed otherwise.     TREATMENT FOR COMMON SIDE EFFECTS:  - Mild abdominal pain, nausea, belching, bloating or excessive gas:  rest,   eat lightly and use a heating pad.  - Sore Throat: treat with throat lozenges and/or gargle with warm salt   water.  - Because air was used during the procedure, expelling large amounts of air   from your rectum or belching is normal.  - If a bowel prep was taken, you may not have a bowel movement for 1-3 days.    This is normal.  SYMPTOMS TO WATCH FOR AND REPORT TO YOUR PHYSICIAN:  1. Abdominal pain or bloating, other than gas cramps.  2. Chest pain.  3. Back pain.  4. Signs of infection such as: chills or fever occurring within 24 hours   after the procedure.  5. Rectal bleeding, which would show as bright red, maroon, or black stools.   (A tablespoon of blood from the rectum is not serious, especially if   hemorrhoids are present.)  6. Vomiting.  7. Weakness or dizziness.  GO  DIRECTLY TO THE NEAREST EMERGENCY ROOM IF YOU HAVE ANY OF THE FOLLOWING:      Difficulty breathing              Chills and/or fever over 101 F   Persistent vomiting and/or vomiting blood   Severe abdominal pain   Severe chest pain   Black, tarry stools   Bleeding- more than one tablespoon   Any other symptom or condition that you feel may need urgent attention  Your doctor recommends these additional instructions:  If any biopsies were taken, your doctors clinic will contact you in 1 to 2   weeks with any results.  - Discharge patient to home.   - Resume previous diet.   - Continue present medications.   - Perform CT scan (computed tomography) of the abdomen with contrast in 3   months for reimaging of area.  For questions, problems or results please call your physician - Maged Cedillo MD.  EMERGENCY PHONE NUMBER: [phone],  LAB RESULTS: [phone]  IF A COMPLICATION OR EMERGENCY SITUATION ARISES AND YOU ARE UNABLE TO REACH   YOUR PHYSICIAN - GO DIRECTLY TO THE EMERGENCY ROOM.  Maged Cedillo MD  8/25/2020 8:31:24 AM  This report has been verified and signed electronically.  [employer]

## 2020-08-25 NOTE — TRANSFER OF CARE
"Anesthesia Transfer of Care Note    Patient: Savannah Guerrero    Procedure(s) Performed: Procedure(s) (LRB):  ULTRASOUND, UPPER GI TRACT, ENDOSCOPIC (N/A)    Patient location: GI    Anesthesia Type: MAC    Transport from OR: Transported from OR on room air with adequate spontaneous ventilation    Post pain: adequate analgesia    Post assessment: no apparent anesthetic complications and tolerated procedure well    Post vital signs: stable    Level of consciousness: awake, alert and oriented    Nausea/Vomiting: no nausea/vomiting    Complications: none    Transfer of care protocol was followed      Last vitals:   Visit Vitals  /65 (BP Location: Left arm, Patient Position: Lying)   Pulse 66   Temp 36.6 °C (97.9 °F) (Temporal)   Resp 18   Ht 5' 1" (1.549 m)   Wt 79.4 kg (175 lb)   SpO2 95%   Breastfeeding No   BMI 33.07 kg/m²     "

## 2020-08-25 NOTE — H&P
Short Stay Endoscopy History and Physical    PCP - Rosy Rodriguez MD  Referring Physician - Maged Cdeillo MD  [address]  [address]  [place] [address]    Procedure - eus  ASA - per anesthesia  Mallampati - per anesthesia  History of Anesthesia problems - no  Family history Anesthesia problems -  no   Plan of anesthesia - General    HPI:  This is a 55 y.o. female here for evaluation of: pancreatitis    Reflux - no  Dysphagia - no  Abdominal pain - no  Diarrhea - no    ROS:  Constitutional: No fevers, chills, No weight loss  CV: No chest pain  Pulm: No cough, No shortness of breath  Ophtho: No vision changes  GI: see HPI  Derm: No rash    Medical History:  has a past medical history of Hyperlipidemia and Hypertension.    Surgical History:  has a past surgical history that includes  section (); Endoscopic ultrasound of upper gastrointestinal tract (N/A, 7/3/2018); Esophagogastroduodenoscopy (N/A, 7/3/2018); and Endoscopic ultrasound of upper gastrointestinal tract (N/A, 10/9/2019).    Family History: family history is not on file..    Social History:  reports that she has never smoked. She has never used smokeless tobacco. She reports that she does not drink alcohol or use drugs.    Review of patient's allergies indicates:  No Known Allergies    Medications:   Medications Prior to Admission   Medication Sig Dispense Refill Last Dose    FLUZONE QUAD 0424-5265, PF, 60 mcg (15 mcg x 4)/0.5 mL Syrg        losartan (COZAAR) 100 MG tablet Take by mouth.   2020 at 2330    OMEPRAZOLE ORAL Take by mouth.    at not taking    rosuvastatin (CRESTOR) 20 MG tablet Take by mouth.   2020 at 2330       Physical Exam:    Vital Signs:   Vitals:    20 0727   BP: 121/65   Pulse: 66   Resp: 18   Temp: 97.9 °F (36.6 °C)       General Appearance: Well appearing in no acute distress    Labs:  Lab Results   Component Value Date    WBC 21.53 (H) 12/15/2018    HGB 14.5 12/15/2018    HCT 45.1  12/15/2018     12/15/2018    ALT 33 12/15/2018    AST 25 12/15/2018     12/15/2018    K 3.8 12/15/2018     12/15/2018    CREATININE 0.7 12/15/2018    BUN 13 12/15/2018    CO2 20 (L) 12/15/2018       I have explained the risks and benefits of this endoscopic procedure to the patient including but not limited to bleeding, inflammation, infection, perforation, and death.      Maged Cedillo MD

## 2020-08-25 NOTE — ANESTHESIA PREPROCEDURE EVALUATION
2020  Savannah Guerrero is a 55 y.o., female for upper EUS under MAC    Past Medical History:   Diagnosis Date    Hyperlipidemia     Hypertension      Past Surgical History:   Procedure Laterality Date     SECTION      ENDOSCOPIC ULTRASOUND OF UPPER GASTROINTESTINAL TRACT N/A 7/3/2018    Procedure: ULTRASOUND, ENDOSCOPIC, UPPER GI TRACT;  Surgeon: Efren Whitley MD;  Location: [facility];  Service: Endoscopy;  Laterality: N/A;    ENDOSCOPIC ULTRASOUND OF UPPER GASTROINTESTINAL TRACT N/A 10/9/2019    Procedure: ULTRASOUND, UPPER GI TRACT, ENDOSCOPIC;  Surgeon: Maged Cedillo MD;  Location: [facility] (2ND FLR);  Service: Endoscopy;  Laterality: N/A;  PM prep    ESOPHAGOGASTRODUODENOSCOPY N/A 7/3/2018    Procedure: EGD (ESOPHAGOGASTRODUODENOSCOPY);  Surgeon: Efren Whitley MD;  Location: [facility];  Service: Endoscopy;  Laterality: N/A;         Pre-op Assessment    I have reviewed the Patient Summary Reports.    I have reviewed the NPO Status.   I have reviewed the Medications.     Review of Systems  Social:  Non-Smoker, No Alcohol Use    Cardiovascular:   Exercise tolerance: good Hypertension hyperlipidemia    Pulmonary:  Pulmonary Normal    Renal/:  Renal/ Normal     Hepatic/GI:   Pancreatic mass       Physical Exam  General:  Well nourished, Obesity    Airway/Jaw/Neck:  Airway Findings: Mouth Opening: Normal Tongue: Normal  General Airway Assessment: Adult  Mallampati: II  TM Distance: Normal, at least 6 cm      Dental:  Dental Findings: Periodontal disease, Mild   Chest/Lungs:  Chest/Lungs Clear    Heart/Vascular:  Heart Findings: Normal       Mental Status:  Mental Status Findings:  Alert and Oriented       Lab Results   Component Value Date    WBC 21.53 (H) 12/15/2018    HGB 14.5 12/15/2018    HCT 45.1 12/15/2018     12/15/2018    ALT 33 12/15/2018    AST  25 12/15/2018     12/15/2018    K 3.8 12/15/2018     12/15/2018    CREATININE 0.7 12/15/2018    BUN 13 12/15/2018    CO2 20 (L) 12/15/2018       Normal sinus rhythm  Normal ECG  No previous ECGs available  Confirmed by Rigoberto Rodrigez MDTea ([id number]) on 6/11/2018 2:42:34 PM    Anesthesia Plan  Type of Anesthesia, risks & benefits discussed:  Anesthesia Type:  MAC, general  Patient's Preference:   Intra-op Monitoring Plan:   Intra-op Monitoring Plan Comments:   Post Op Pain Control Plan:   Post Op Pain Control Plan Comments:   Induction:    Beta Blocker:  Patient is not currently on a Beta-Blocker (No further documentation required).       Informed Consent: Patient understands risks and agrees with Anesthesia plan.  Questions answered. Anesthesia consent signed with patient.  ASA Score: 2     Day of Surgery Review of History & Physical:            Ready For Surgery From Anesthesia Perspective.

## 2020-08-25 NOTE — ANESTHESIA POSTPROCEDURE EVALUATION
Anesthesia Post Evaluation    Patient: Savannah Guerrero    Procedure(s) Performed: Procedure(s) (LRB):  ULTRASOUND, UPPER GI TRACT, ENDOSCOPIC (N/A)    Final Anesthesia Type: MAC    Patient location during evaluation: GI PACU  Patient participation: Yes- Able to Participate  Level of consciousness: awake and alert and oriented  Post-procedure vital signs: reviewed and stable  Pain management: adequate  Airway patency: patent    PONV status at discharge: No PONV  Anesthetic complications: no      Cardiovascular status: blood pressure returned to baseline, hemodynamically stable and stable  Respiratory status: unassisted, spontaneous ventilation and room air  Hydration status: euvolemic  Follow-up not needed.          Vitals Value Taken Time   /54 08/25/20 0823   Temp 36.7 08/25/20 0823   Pulse 72 08/25/20 0823   Resp 20 08/25/20 0823   SpO2 100 08/25/20 0823         No case tracking events are documented in the log.      Pain/Rohan Score: No data recorded

## 2020-08-25 NOTE — DISCHARGE INSTRUCTIONS
Endoscopic Ultrasound (EUS)    An endoscopic ultrasound (EUS) is a test to look at the inside of your gastrointestinal (GI) tract. It's commonly used to look for cancers or growths in the esophagus, stomach, pancreas, liver, and rectum. It can help to stage cancer (see how advanced a cancer is). EUS may also be used to help diagnose certain diseases or to drain cysts or abscesses.  What is EUS?  EUS shows both ultrasound images and live video of the GI tract. During the test, a flexible tube called an endoscope (scope) is used. At the end of the scope is a tiny video camera and light. The video camera sends live images to a monitor. The scope also contains a very small ultrasound device. This uses sound waves to create images and send them to a monitor.  A needle is passed through the scope. The needle can be used take a small sample of tissue for testing. This is called a biopsy. The needle can be used to take a sample of fluid. This is called fine-needle aspiration (FNA).  Risks and possible complications of EUS  Risks and possible complications include the following:  · Bleeding  · Infection  · A perforation (hole) in the digestive tract   · Risks of sedation or anesthesia   Before the test  Be prepared prior to the test:  · Tell your healthcare provider what medicine you take. This includes vitamins, herbs, and over-the-counter medicine. It also includes any blood thinners, such as warfarin, clopidogrel, ibuprofen, or daily aspirin. Ask your healthcare provider if you need to stop taking some or all of them before the test.  · You may be prescribed antibiotics to take before or after the test. This depends on the area being studied and what is done during the test. These medicines help prevent infection.  · Carefully follow the instructions for preparing for the test to make sure results are accurate. Instructions may include:  ¨ If youre having an EUS of the upper GI tract (esophagus, stomach, duodenum,  pancreas, liver):  § Do not eat or drink for 6 hours before the test.  ¨ If youre having an EUS of the lower GI tract (rectum):  § Before the test, do bowel prep as instructed to clean your rectum of stool. This may involve a clear liquid diet and using a laxative (liquid or pills) the night before the test. Or it may mean doing one or more enemas the morning of the test.  § Do not eat or drink for 6 hours before the test.  · Be sure to arrive on time at the facility. Bring your identification and health insurance card. Leave valuables at home. If you have them, bring X-rays or other test results with you.  Let the healthcare provider know  For your safety, tell the healthcare provider if you:  · Take insulin. Your dose may need to be changed on the day of your test.  · Are allergic to latex.  · Have any other allergies.  · Are taking blood thinners.   During the test  An endoscopic ultrasound usually takes place in a hospital. The procedure itself may take 1 to 2 hours. You will likely go home soon afterward. During the test:  · You lie on your left side on an exam table.  · An intravenous (IV) line will be put into a vein in your arm or hand. This line supplies fluids and medicines. To keep you comfortable during the test, you will be given a sedative medicine. This medicine prevents discomfort and will make you sleepy.  · If you are having an EUS of the upper GI tract, local anesthetic may be sprayed in your throat. This will help you be more comfortable as the healthcare provider inserts the scope. The healthcare provider then gently puts the flexible scope into your mouth or nose and down your throat.  · If youre having an EUS of the lower GI tract, the healthcare provider gently puts the flexible scope into your anus.  · During the test, the scope sends live video and ultrasound images from inside your body to nearby monitors. These are used to examine your GI tract. Specialized procedures, such as drainage,  are done as needed.  · The healthcare provider may discuss the results with you soon after the test. Biopsy results take several  days.  · In most cases, you can go home within a few hours of the test. When you leave the facility, have an adult family member or friend drive you, even if you don't feel that sleepy.  After the test  Here is what to expect after the test:  · You may feel tired from the sedative. This should wear off by the end of the day.  · If you had an upper digestive endoscopy, your throat may feel sore for a day or two. Over-the-counter sore throat lozenges and spray should help.  · You can eat and drink normally as soon as the test is done.  When to call the healthcare provider  Call your healthcare provider if you notice any of the following:  · Fever of 100.4°F (38.0°C) or higher, or as advised by your healthcare provider  · Shortness of breath  · Vomiting blood, blood in stool, or black stools  · Coughing or hoarse voice that wont go away   Date Last Reviewed: 7/1/2016  © 9530-0755 [employer]. [address] [place] [address]. All rights reserved. This information is not intended as a substitute for professional medical care. Always follow your healthcare professional's instructions.

## 2020-09-03 ENCOUNTER — TELEPHONE (OUTPATIENT)
Dept: GASTROENTEROLOGY | Facility: CLINIC | Age: 55
End: 2020-09-03

## 2020-09-03 DIAGNOSIS — K86.2 PANCREATIC CYST: ICD-10-CM

## 2020-09-03 NOTE — TELEPHONE ENCOUNTER
Post procedure Instructions: Perform CT scan (computed tomography) of the abdomen with contrast in 3 months for reimaging of area. Please contact patient to schedule.

## 2020-12-07 ENCOUNTER — HOSPITAL ENCOUNTER (OUTPATIENT)
Dept: RADIOLOGY | Facility: HOSPITAL | Age: 55
Discharge: HOME | End: 2020-12-07
Attending: NURSE PRACTITIONER
Payer: MEDICAID

## 2020-12-07 DIAGNOSIS — Z12.31 ENCOUNTER FOR SCREENING MAMMOGRAM FOR MALIGNANT NEOPLASM OF BREAST: ICD-10-CM

## 2020-12-07 PROCEDURE — 77067 SCR MAMMO BI INCL CAD: CPT | Mod: 26,,, | Performed by: RADIOLOGY

## 2020-12-07 PROCEDURE — 77067 SCR MAMMO BI INCL CAD: CPT | Mod: TC

## 2020-12-07 PROCEDURE — 77063 BREAST TOMOSYNTHESIS BI: CPT | Mod: 26,,, | Performed by: RADIOLOGY

## 2020-12-07 PROCEDURE — 77063 MAMMO DIGITAL SCREENING BILAT WITH TOMO: ICD-10-PCS | Mod: 26,,, | Performed by: RADIOLOGY

## 2020-12-07 PROCEDURE — 77067 MAMMO DIGITAL SCREENING BILAT WITH TOMO: ICD-10-PCS | Mod: 26,,, | Performed by: RADIOLOGY

## 2021-04-04 ENCOUNTER — TELEPHONE (OUTPATIENT)
Dept: TRANSPLANT | Facility: CLINIC | Age: 56
End: 2021-04-04

## 2021-04-06 ENCOUNTER — TELEPHONE (OUTPATIENT)
Dept: TRANSPLANT | Facility: CLINIC | Age: 56
End: 2021-04-06

## 2021-06-16 ENCOUNTER — DOCUMENTATION ONLY (OUTPATIENT)
Dept: TRANSPLANT | Facility: CLINIC | Age: 56
End: 2021-06-16

## 2021-06-22 ENCOUNTER — PROCEDURE VISIT (OUTPATIENT)
Dept: HEPATOLOGY | Facility: CLINIC | Age: 56
End: 2021-06-22
Payer: MEDICAID

## 2021-06-22 ENCOUNTER — LAB VISIT (OUTPATIENT)
Dept: LAB | Facility: HOSPITAL | Age: 56
End: 2021-06-22
Payer: MEDICAID

## 2021-06-22 ENCOUNTER — OFFICE VISIT (OUTPATIENT)
Dept: HEPATOLOGY | Facility: CLINIC | Age: 56
End: 2021-06-22
Payer: MEDICAID

## 2021-06-22 VITALS
SYSTOLIC BLOOD PRESSURE: 135 MMHG | TEMPERATURE: 97 F | OXYGEN SATURATION: 95 % | BODY MASS INDEX: 32.59 KG/M2 | WEIGHT: 172.63 LBS | RESPIRATION RATE: 18 BRPM | HEIGHT: 61 IN | HEART RATE: 76 BPM | DIASTOLIC BLOOD PRESSURE: 72 MMHG

## 2021-06-22 DIAGNOSIS — R74.8 ELEVATED LIVER ENZYMES: Primary | ICD-10-CM

## 2021-06-22 DIAGNOSIS — K76.0 HEPATIC STEATOSIS: ICD-10-CM

## 2021-06-22 DIAGNOSIS — R16.0 HEPATOMEGALY: ICD-10-CM

## 2021-06-22 DIAGNOSIS — R74.8 ELEVATED LIVER ENZYMES: ICD-10-CM

## 2021-06-22 DIAGNOSIS — K74.01 EARLY HEPATIC FIBROSIS: ICD-10-CM

## 2021-06-22 DIAGNOSIS — E66.9 OBESITY (BMI 30-39.9): Primary | ICD-10-CM

## 2021-06-22 LAB
ALBUMIN SERPL BCP-MCNC: 3.7 G/DL (ref 3.5–5.2)
ALP SERPL-CCNC: 97 U/L (ref 55–135)
ALT SERPL W/O P-5'-P-CCNC: 95 U/L (ref 10–44)
AST SERPL-CCNC: 73 U/L (ref 10–40)
BILIRUB DIRECT SERPL-MCNC: 0.2 MG/DL (ref 0.1–0.3)
BILIRUB SERPL-MCNC: 0.5 MG/DL (ref 0.1–1)
HBV CORE AB SERPL QL IA: NEGATIVE
HEPATITIS A ANTIBODY, IGG: POSITIVE
PROT SERPL-MCNC: 8.1 G/DL (ref 6–8.4)

## 2021-06-22 PROCEDURE — 86706 HEP B SURFACE ANTIBODY: CPT | Performed by: NURSE PRACTITIONER

## 2021-06-22 PROCEDURE — 99999 PR PBB SHADOW E&M-EST. PATIENT-LVL III: ICD-10-PCS | Mod: PBBFAC,,, | Performed by: NURSE PRACTITIONER

## 2021-06-22 PROCEDURE — 91200 FIBROSCAN (VIBRATION CONTROLLED TRANSIENT ELASTOGRAPHY): ICD-10-PCS | Mod: 26,S$PBB,, | Performed by: NURSE PRACTITIONER

## 2021-06-22 PROCEDURE — 91200 LIVER ELASTOGRAPHY: CPT | Mod: 26,S$PBB,, | Performed by: NURSE PRACTITIONER

## 2021-06-22 PROCEDURE — 99204 OFFICE O/P NEW MOD 45 MIN: CPT | Mod: S$PBB,,, | Performed by: NURSE PRACTITIONER

## 2021-06-22 PROCEDURE — 99204 PR OFFICE/OUTPT VISIT, NEW, LEVL IV, 45-59 MIN: ICD-10-PCS | Mod: S$PBB,,, | Performed by: NURSE PRACTITIONER

## 2021-06-22 PROCEDURE — 91200 LIVER ELASTOGRAPHY: CPT | Mod: PBBFAC | Performed by: NURSE PRACTITIONER

## 2021-06-22 PROCEDURE — 80076 HEPATIC FUNCTION PANEL: CPT | Performed by: NURSE PRACTITIONER

## 2021-06-22 PROCEDURE — 99999 PR PBB SHADOW E&M-EST. PATIENT-LVL III: CPT | Mod: PBBFAC,,, | Performed by: NURSE PRACTITIONER

## 2021-06-22 PROCEDURE — 99213 OFFICE O/P EST LOW 20 MIN: CPT | Mod: PBBFAC,25 | Performed by: NURSE PRACTITIONER

## 2021-06-22 PROCEDURE — 86790 VIRUS ANTIBODY NOS: CPT | Performed by: NURSE PRACTITIONER

## 2021-06-22 PROCEDURE — 86704 HEP B CORE ANTIBODY TOTAL: CPT | Performed by: NURSE PRACTITIONER

## 2021-06-22 RX ORDER — METFORMIN HYDROCHLORIDE 500 MG/1
500 TABLET ORAL DAILY
COMMUNITY
Start: 2021-05-26

## 2021-06-25 LAB
HBV SURFACE AB SER QL IA: NEGATIVE
HBV SURFACE AB SERPL IA-ACNC: <3 MIU/ML

## 2021-12-13 ENCOUNTER — HOSPITAL ENCOUNTER (OUTPATIENT)
Dept: RADIOLOGY | Facility: HOSPITAL | Age: 56
Discharge: HOME | End: 2021-12-13
Attending: INTERNAL MEDICINE
Payer: MEDICAID

## 2021-12-13 DIAGNOSIS — Z12.31 BREAST CANCER SCREENING BY MAMMOGRAM: ICD-10-CM

## 2021-12-13 PROCEDURE — 77067 MAMMO DIGITAL SCREENING BILAT WITH TOMO: ICD-10-PCS | Mod: 26,,, | Performed by: RADIOLOGY

## 2021-12-13 PROCEDURE — 77067 SCR MAMMO BI INCL CAD: CPT | Mod: TC

## 2021-12-13 PROCEDURE — 77063 BREAST TOMOSYNTHESIS BI: CPT | Mod: 26,,, | Performed by: RADIOLOGY

## 2021-12-13 PROCEDURE — 77067 SCR MAMMO BI INCL CAD: CPT | Mod: 26,,, | Performed by: RADIOLOGY

## 2021-12-13 PROCEDURE — 77063 MAMMO DIGITAL SCREENING BILAT WITH TOMO: ICD-10-PCS | Mod: 26,,, | Performed by: RADIOLOGY

## 2022-06-21 ENCOUNTER — LAB VISIT (OUTPATIENT)
Dept: LAB | Facility: HOSPITAL | Age: 57
End: 2022-06-21
Payer: MEDICAID

## 2022-06-21 ENCOUNTER — OFFICE VISIT (OUTPATIENT)
Dept: HEPATOLOGY | Facility: CLINIC | Age: 57
End: 2022-06-21
Payer: MEDICAID

## 2022-06-21 VITALS
RESPIRATION RATE: 18 BRPM | HEIGHT: 61 IN | WEIGHT: 168.44 LBS | DIASTOLIC BLOOD PRESSURE: 75 MMHG | SYSTOLIC BLOOD PRESSURE: 131 MMHG | OXYGEN SATURATION: 94 % | BODY MASS INDEX: 31.8 KG/M2 | TEMPERATURE: 99 F | HEART RATE: 84 BPM

## 2022-06-21 DIAGNOSIS — R16.0 HEPATOMEGALY: ICD-10-CM

## 2022-06-21 DIAGNOSIS — K76.0 HEPATIC STEATOSIS: ICD-10-CM

## 2022-06-21 DIAGNOSIS — R74.8 ELEVATED LIVER ENZYMES: ICD-10-CM

## 2022-06-21 DIAGNOSIS — K76.0 HEPATIC STEATOSIS: Primary | ICD-10-CM

## 2022-06-21 DIAGNOSIS — K86.2 PANCREAS CYST: ICD-10-CM

## 2022-06-21 DIAGNOSIS — Z23 NEED FOR HEPATITIS B VACCINATION: ICD-10-CM

## 2022-06-21 LAB
ALBUMIN SERPL BCP-MCNC: 3.5 G/DL (ref 3.5–5.2)
ALP SERPL-CCNC: 94 U/L (ref 55–135)
ALT SERPL W/O P-5'-P-CCNC: 57 U/L (ref 10–44)
AST SERPL-CCNC: 35 U/L (ref 10–40)
BILIRUB DIRECT SERPL-MCNC: 0.2 MG/DL (ref 0.1–0.3)
BILIRUB SERPL-MCNC: 0.5 MG/DL (ref 0.1–1)
PROT SERPL-MCNC: 7.7 G/DL (ref 6–8.4)

## 2022-06-21 PROCEDURE — 3078F DIAST BP <80 MM HG: CPT | Mod: CPTII,,, | Performed by: NURSE PRACTITIONER

## 2022-06-21 PROCEDURE — 3075F SYST BP GE 130 - 139MM HG: CPT | Mod: CPTII,,, | Performed by: NURSE PRACTITIONER

## 2022-06-21 PROCEDURE — 3008F PR BODY MASS INDEX (BMI) DOCUMENTED: ICD-10-PCS | Mod: CPTII,,, | Performed by: NURSE PRACTITIONER

## 2022-06-21 PROCEDURE — 99999 PR PBB SHADOW E&M-EST. PATIENT-LVL V: CPT | Mod: PBBFAC,,, | Performed by: NURSE PRACTITIONER

## 2022-06-21 PROCEDURE — 99214 OFFICE O/P EST MOD 30 MIN: CPT | Mod: S$PBB,,, | Performed by: NURSE PRACTITIONER

## 2022-06-21 PROCEDURE — 99999 PR PBB SHADOW E&M-EST. PATIENT-LVL V: ICD-10-PCS | Mod: PBBFAC,,, | Performed by: NURSE PRACTITIONER

## 2022-06-21 PROCEDURE — 36415 COLL VENOUS BLD VENIPUNCTURE: CPT | Performed by: NURSE PRACTITIONER

## 2022-06-21 PROCEDURE — 1160F PR REVIEW ALL MEDS BY PRESCRIBER/CLIN PHARMACIST DOCUMENTED: ICD-10-PCS | Mod: CPTII,,, | Performed by: NURSE PRACTITIONER

## 2022-06-21 PROCEDURE — 3008F BODY MASS INDEX DOCD: CPT | Mod: CPTII,,, | Performed by: NURSE PRACTITIONER

## 2022-06-21 PROCEDURE — 99214 PR OFFICE/OUTPT VISIT, EST, LEVL IV, 30-39 MIN: ICD-10-PCS | Mod: S$PBB,,, | Performed by: NURSE PRACTITIONER

## 2022-06-21 PROCEDURE — 1159F MED LIST DOCD IN RCRD: CPT | Mod: CPTII,,, | Performed by: NURSE PRACTITIONER

## 2022-06-21 PROCEDURE — 3078F PR MOST RECENT DIASTOLIC BLOOD PRESSURE < 80 MM HG: ICD-10-PCS | Mod: CPTII,,, | Performed by: NURSE PRACTITIONER

## 2022-06-21 PROCEDURE — 3075F PR MOST RECENT SYSTOLIC BLOOD PRESS GE 130-139MM HG: ICD-10-PCS | Mod: CPTII,,, | Performed by: NURSE PRACTITIONER

## 2022-06-21 PROCEDURE — 1160F RVW MEDS BY RX/DR IN RCRD: CPT | Mod: CPTII,,, | Performed by: NURSE PRACTITIONER

## 2022-06-21 PROCEDURE — 1159F PR MEDICATION LIST DOCUMENTED IN MEDICAL RECORD: ICD-10-PCS | Mod: CPTII,,, | Performed by: NURSE PRACTITIONER

## 2022-06-21 PROCEDURE — 99215 OFFICE O/P EST HI 40 MIN: CPT | Mod: PBBFAC | Performed by: NURSE PRACTITIONER

## 2022-06-21 PROCEDURE — 80076 HEPATIC FUNCTION PANEL: CPT | Performed by: NURSE PRACTITIONER

## 2022-06-21 RX ORDER — HEPATITIS B VACCINE (RECOMBINANT) ADJUVANTED 20 UG/.5ML
20 INJECTION, SOLUTION INTRAMUSCULAR ONCE
Qty: 0.5 ML | Refills: 1 | Status: SHIPPED | OUTPATIENT
Start: 2022-06-21 | End: 2022-08-03

## 2022-06-21 NOTE — PATIENT INSTRUCTIONS
1. Repeat Fibroscan in 1 year to re-assess for fat and scar tissue in the liver  2. Call Dr. Cedillo about about arranging CT to follow pancreatic abnormalities.  3. Repeat liver function tests today.  4. Recommend vaccination for Hepatitis B. RX sent to [facility] in [place].  5. Recommend a referral to a nutritionist to discuss dietary changes. Please call [phone] or email [email or web address] to get scheduled.   6. Return to clinic in 1 year with repeat Fibroscan at visit.    There is no FDA approved therapy for non-alcoholic fatty liver disease. Therefore, these things are important:  1. Weight loss goal of 15 lbs.  2. Low carb/sugar, high fiber and protein diet.Try to limit your carb intake to LESS than 30-45 grams of carbs with a meal or LESS than 5-10 grams with any snack (total of any snack foods eaten during that time). Use MyFitness Pal gautam to add up your carbs through the day. Do NOT drink any beverages with calories or carbs (this can lead to high blood sugar and weight gain). Also, some of our patients have been very successful with weight loss using the pre made/planned meal planning services that limit calories and portion size (one example is Sensible Meals but there are many out there).  3. Exercise, 5 days per week, 30 minutes per day, as tolerated.  4. Recommend good control of cholesterol, blood pressure, & blood sugar levels.    In some people, fatty liver can progress to steatohepatitis (inflamatory fatty liver) and possibly to cirrhosis, putting one at increased risk for liver cancer, liver failure, and death. Therefore, the lifestyle changes are very important to decrease this risk.     Website with information about fatty liver and inflammation related to fatty liver ([place]DR[place]GE) = www.nashtruth.com  AND www.NASHactually.com

## 2022-06-21 NOTE — PROGRESS NOTES
[facility] V[facility] ESTABLISHED PT NOTE    REFERRING PROVIDER:  No ref. provider found    CHIEF COMPLAINT: Elevated Liver Enzymes/Fatty Liver    HPI: This is a 56 y.o. White female with PMH noted below, presenting for follow up for elevated liver enzymes and fatty liver.    The patient's risk factors for NAFLD include:     · Obesity                                        Yes; BMI 31.82  · Dyslipidemia                                Yes - on low dose Crestor  · Insulin resistance/Diabetes         Yes - on low dose Metformin  · Family history of diabetes            Yes     Labs in 2021 showed an AST of 100, ALT of 97, with normal synthetic function. Repeat labs in 2021 showed an ALT of 95, AST of 73. Prior imaging has shown hepatomegaly (18.5 cm) and hepatic steatosis. She also has pancreatic cysts, followed by Dr. Maged Cedillo. She had an EUS in 2020, and is overdue for follow up CT scan.   She has a strong family history of NAFLD/ELDRIDGE. One of her maternal aunts  of liver cancer. She denies any history of heavy alcohol use or illicit drug use. She is immune to Hepatitis A. She has not been exposed to Hepatitis B, and is not immune to the virus. Fibroscan to stage liver disease in 2021 was suggestive of significant hepatic steatosis, with F0-F1 fibrosis, and a low likelihood of cirrhosis. She has lost 5 pounds since last visit. She is well appearing and has no jaundice, dark urine, pruritus, abdominal distention, hematemesis, melena, or periods of confusion suggestive of hepatic encephalopathy.      Review of patient's allergies indicates:  No Known Allergies    Current Outpatient Medications on File Prior to Visit   Medication Sig Dispense Refill    losartan (COZAAR) 100 MG tablet Take by mouth.      metFORMIN (GLUCOPHAGE) 500 MG tablet Take 500 mg by mouth once daily.      OMEPRAZOLE ORAL Take by mouth.      rosuvastatin (CRESTOR) 20 MG tablet Take by mouth.       No current  "facility-administered medications on file prior to visit.     PMHX:  has a past medical history of Elevated liver enzymes, Fatty liver, Hepatomegaly, Hyperlipidemia, and Hypertension.    PSHX:  has a past surgical history that includes  section (); Endoscopic ultrasound of upper gastrointestinal tract (N/A, 7/3/2018); Esophagogastroduodenoscopy (N/A, 7/3/2018); Endoscopic ultrasound of upper gastrointestinal tract (N/A, 10/9/2019); and Endoscopic ultrasound of upper gastrointestinal tract (N/A, 2020).    FAMILY HISTORY: Negative for liver disease, reviewed in [facility]    SOCIAL HISTORY:   Social History     Tobacco Use   Smoking Status Never Smoker   Smokeless Tobacco Never Used       Social History     Substance and Sexual Activity   Alcohol Use No       Social History     Substance and Sexual Activity   Drug Use No     ROS:   GENERAL: Denies fever, chills, weight loss/gain, fatigue  HEENT: Denies headaches, dizziness, vision/hearing changes  CARDIOVASCULAR: Denies chest pain, palpitations, or edema  RESPIRATORY: Denies dyspnea, cough  GI: Denies abdominal pain, rectal bleeding, nausea, vomiting. No change in bowel pattern or color  : Denies dysuria, hematuria   SKIN: Denies rash, itching   NEURO: Denies confusion, memory loss, or mood changes  PSYCH: Denies depression or anxiety  HEME/LYMPH: Denies easy bruising or bleeding    PHYSICAL EXAM:   Friendly White female, in no acute distress; alert and oriented to person, place and time  VITALS: /75 (BP Location: Right arm, Patient Position: Sitting, BP Method: Medium (Automatic))   Pulse 84   Temp 98.5 °F (36.9 °C) (Oral)   Resp 18   Ht 5' 1" (1.549 m)   Wt 76.4 kg (168 lb 6.9 oz)   LMP  (LMP Unknown)   SpO2 (!) 94%   BMI 31.82 kg/m²   HENT: Normocephalic, without obvious abnormality.   EYES: Sclerae anicteric.   NECK: No obvious masses.  CARDIOVASCULAR: No peripheral edema.  RESPIRATORY: Normal respiratory effort.  GI: Soft, non-tender, " non-distended, obese abdomen.  EXTREMITIES:  No clubbing, cyanosis or edema.  SKIN: Warm and dry. No jaundice. No rashes noted to exposed skin.   NEURO:  Normal gait. No asterixis.  PSYCH:  Memory intact. Thought and speech pattern appropriate. Behavior normal. No depression or anxiety noted.    DIAGNOSTIC STUDIES:    OUTSIDE LABS:                 MRI ABDOMEN W/W/O CONTRAST W/MRCP 8/18/2020:    FINDINGS:    The liver is enlarged.  Hepatic parenchyma demonstrates diffuse signal dropout on out of phase imaging in keeping with steatosis.  There is a 0.7 cm T2 hyperintense lesion within segment 3 that demonstrates homogeneous enhancement on early arterial phase that persist on portal venous and delayed phases, findings that are consistent with a flash filling hemangioma.  No intrahepatic bile duct dilatation.  Portal vasculature is patent.     Gallbladder is surgically absent.  Common bile duct is dilated measuring 1 cm in maximum dimension with tapering at the level of the ampulla.  No intraductal filling defects.     Stomach, duodenum, spleen and adrenal glands are within normal limits.     There has been interval decreased size of the previous pancreatic cystic lesion now measuring 1.3 cm (series 1500, image 67 previously 2.3 cm).  There is a new 1.9 cm rim enhancing T2 hypointense, T1 isointense lesion at the level of the pancreatic tail, presumably a complex cyst.  No pancreatic ductal dilatation.  Remaining pancreatic parenchyma demonstrates homogeneous enhancement without focal lesions.  No peripancreatic inflammatory changes.     Kidneys are normal in size and location.  Subcentimeter T2 hyperintense nonenhancing cortical lesions noted within both kidneys, too small to accurately characterize but favored to represent cysts.  No hydronephrosis or proximal hydroureter.     The small bowel is normal in caliber.  Visualized colon is unremarkable.  The appendix is normal.     The abdominal aorta tapers normally.  IVC  is patent.     No abdominal lymphadenopathy.  No focal mesenteric masses.  No omental or peritoneal implants.     Subcutaneous soft tissues are within normal limits.  No marrow signal abnormality to suggest an infiltrative process.     Impression:     1. Interval decreased size of the previously identified pancreatic cystic lesion at the level of the body/tail which demonstrated characteristics consistent with pseudocyst on EUS dated 10/09/2019.  2. Interval development of a new rim enhancing lesion at the level of the pancreatic tail, favored to represent a complex (proteinaceous versus hemorrhagic) pseudocyst.  A necrotic solid neoplasm is possible but felt to be less likely.  Further characterization with EUS should be considered.  3. Hepatomegaly and hepatic steatosis.  4. Subcentimeter left hepatic lobe enhancing lesion, likely a flash filling hemangioma.  This report was flagged in Epic as abnormal.    FIBROSCAN 6/22/2021:    Findings  Median liver stiffness score:  6.8  CAP Reading: dB/m:  371     IQR/med %:  7  Interpretation  Fibrosis interpretation is based on medial liver stiffness - Kilopascal (kPa).     Fibrosis Stage:  F 0-1  Steatosis interpretation is based on controlled attenuation parameter - (dB/m).     Steatosis Grade:  S3    EDUCATION:  Per AVS.    ASSESSMENT & PLAN:  56 y.o. White female with:    1. Hepatic steatosis  Hepatic Function Panel    Ambulatory Referral/Consult to Nutrition Services - [facility]    FibroScan (Vibration Controlled Transient Elastography)    CANCELED: FibroScan (Vibration Controlled Transient Elastography)   2. Hepatomegaly  Hepatic Function Panel    Ambulatory Referral/Consult to Nutrition Services - [facility]    FibroScan (Vibration Controlled Transient Elastography)    CANCELED: FibroScan (Vibration Controlled Transient Elastography)   3. Elevated liver enzymes  Hepatic Function Panel    Ambulatory Referral/Consult to Nutrition Services -  [facility]    FibroScan (Vibration Controlled Transient Elastography)    CANCELED: FibroScan (Vibration Controlled Transient Elastography)   4. Pancreas cyst  Hepatic Function Panel    CANCELED: FibroScan (Vibration Controlled Transient Elastography)   5. Need for hepatitis B vaccination  hepatitis B vacc-CpG 1018, PF, (HEPLISAV-B, PF,) 20 mcg/0.5 mL Soln     - Repeat Fibroscan to restage liver disease in 1 year.  - Recommend an Ultrasound of the liver every 2 years, due now (patient will send message to Dr. Cedillo to arrange f/u CT).  - Repeat liver function tests today.  - Recommend vaccination for Hepatitis B (RX sent to [facility] in [place]).  - Recommend a referral to a nutritionist to discuss dietary changes. Referral placed.  - Recommend additional weight loss of 15 lbs, through diet and exercise.  - Recommend good control of cholesterol, blood pressure, & blood sugar levels.    Follow up in about 1 year (around 6/21/2023). with repeat Fibroscan prior to visit.    Thank you for allowing me to participate in the care of Saavnnah Guerrero       Hepatology Nurse Practitioner  [facility]  6/21/2022 @ 0935    CC'ed note to:   No ref. provider found  Rosy Rodriguez MD

## 2022-08-08 ENCOUNTER — NUTRITION (OUTPATIENT)
Dept: NUTRITION | Facility: CLINIC | Age: 57
End: 2022-08-08
Payer: MEDICAID

## 2022-08-08 DIAGNOSIS — K76.0 HEPATIC STEATOSIS: ICD-10-CM

## 2022-08-08 DIAGNOSIS — R74.8 ELEVATED LIVER ENZYMES: ICD-10-CM

## 2022-08-08 DIAGNOSIS — R16.0 HEPATOMEGALY: ICD-10-CM

## 2022-08-08 PROCEDURE — 97802 MEDICAL NUTRITION INDIV IN: CPT | Mod: PBBFAC,PO | Performed by: NUTRITIONIST

## 2022-08-08 PROCEDURE — 99211 OFF/OP EST MAY X REQ PHY/QHP: CPT | Mod: PBBFAC,PO | Performed by: NUTRITIONIST

## 2022-08-08 PROCEDURE — 99999 PR PBB SHADOW E&M-EST. PATIENT-LVL I: CPT | Mod: PBBFAC,,, | Performed by: NUTRITIONIST

## 2022-08-08 PROCEDURE — 99999 PR PBB SHADOW E&M-EST. PATIENT-LVL I: ICD-10-PCS | Mod: PBBFAC,,, | Performed by: NUTRITIONIST

## 2022-08-08 NOTE — PROGRESS NOTES
"Nutrition Assessment for Initial Medical Nutrition Therapy      Reason for MNT visit: Pt in for education and nutrition counseling regarding Fatty Liver      ASSESSMENT    Age: 56 y.o.  Wt: 168 lbs  Wt Readings from Last 1 Encounters:   06/21/22 76.4 kg (168 lb 6.9 oz)     Ht: 5'1"  Ht Readings from Last 1 Encounters:   06/21/22 5' 1" (1.549 m)     BMI: 31.8  BMI Readings from Last 1 Encounters:   06/21/22 31.82 kg/m²       Clinical Signs/Symptoms: none to assess. Patient stated she is here because her doctor wants her to lose weight    Medical History:   Past Medical History:   Diagnosis Date    Elevated liver enzymes     Fatty liver     Hepatomegaly     Hyperlipidemia     Hypertension      Problem List           Resolved    Calculus of gallbladder with chronic cholecystitis without obstruction         Cystic mass of pancreas         Pain of upper abdomen         Rash         Pancreas cyst         S/P laparoscopic cholecystectomy         Elevated liver enzymes         Hepatomegaly         Hepatic steatosis         Early hepatic fibrosis         Need for hepatitis B vaccination               Medications:   Current Outpatient Medications:     losartan (COZAAR) 100 MG tablet, Take by mouth., Disp: , Rfl:     metFORMIN (GLUCOPHAGE) 500 MG tablet, Take 500 mg by mouth once daily., Disp: , Rfl:     OMEPRAZOLE ORAL, Take by mouth., Disp: , Rfl:     rosuvastatin (CRESTOR) 20 MG tablet, Take by mouth., Disp: , Rfl:     Food allergies  Intolerances: NKFA    Labs:  Reviewed and noted  No results found for: HGBA1C  No results found for: CHOL, TRIG, HDL, LDLCALC    Typical day recall: Reviewed and noted during consult     Beverages: 1, 16.9 oz bottle water daily; coffee with Splenda    Dining out: Regular, 1-2 times per week    Alcohol: nonsmoker, no alcohol    Lifestyle Influences  Support System: her sister    Meal preparation/shopping: self, family member    Current Activity Level: currently not much. She will " clean her house, but she has a sedentary job as a manicurist     Patient motivation, anticipated barriers, expected compliance: Patient is motivated and has verbalized understanding and intent to comply     DIAGNOSIS   Problem: excessive Energy intake  Etiology: RT eating too many calories  Signs/Symptoms: AEB 24 hour recall and BMI    INTERVENTION  Nutrition prescription: estimated energy requirements:   Calories: 1600  Protein: 110-140 grams  Carbohydrates: 120-140 grams  Fats: choose plant-based heart healthy fats  Total fluid: 85 oz + sweat loss  Limit added sugar to 20 grams or less per day (Note: 1 teaspoon of sugar = ~5 gram)      Recommendations & Goals:  Patient goals and recommendations are tailored to the specific patient's needs, readiness to change, lifestyle, culture, skills, resources, & abilities. Strategies to help achieve these nutrition-related goals were discussed which can include but are not limited to SMART goal setting & mindful eating.     Aim for a minimum of 7 hours sleep   Exercise 60 minutes most days  Eat breakfast within 1-2 hours of waking up  Try not to skip any meals or snacks, not going more than 3-4 hours without eating.   At each meal and snack, try to include a source of fiber + lean protein + healthy fat.       Written Materials Provided  These resources are intended to assist the patient in making it easier to choose recommended options when eating out & to identify better-for-you brands at the grocery store:     Meal Planning Guide with recommendations discussed along with portion sizes and a meal plan    Fueling Well On The Go Guide   Eat Fit Grocery Product list    RD contact information- for patient to contact regarding any questions, needs, and/or concerns that may arise     MONITORING & EVALUATION    Communicated with healthcare provider    Documented plan for referral to appropriate agency/healthcare provider as needed    Comprehension: fair     Motivation to  change: moderate    Follow-up: 2 months    Counseling time: 2 Hours

## 2022-08-08 NOTE — PATIENT INSTRUCTIONS
Name: Savannah Guerrero  Date: 8.8.2022    Daily Energy Requirements:  Calories: 1600  Protein: 110-140 grams  Carbohydrates: 120-140 grams  Fats: choose plant-based heart healthy fats  Total fluid: 85 oz + sweat loss  Limit added sugar to 20 grams or less per day (Note: 1 teaspoon of sugar = ~5 gram)    Breakfast: 2 servings of carbs = 30-40 grams + at least 20 grams lean protein/heart healthy fat  1 cup cereal (see notes for higher protein brands) + 2% Fairlife milk + ½ banana   2 whole eggs + 1-2 tablespoons cheese on 2 corn tortillas  Note, if you want 1/3 cup cooked beans, then do only 1 corn tortilla  ½ Sebas's Killer 21 whole grains and seeds bagel + 2 tablespoons cream cheese -or- peanut butter  2 slices 100% whole wheat bread (see notes) + 2 tablespoons regular peanut butter + 1 tablespoon no sugar added (NSA) jelly    Snack: A mid-morning snack may be needed if going >3-4 hours between meals      Lunch: 4-5 oz lean protein + unlimited non-starchy veggies + 2 servings of carbs = 30-40 grams  Examples of 2 servings of carbohydrates = 30-40 grams:  1 cup cooked pasta (see notes brand examples); 2/3 cup cooked brown rice; 1 medium potato or sweet potato (5-6 oz in weight); 1 medium size piece of fruit + 2 slices Sebas's Killer thin sliced bread; 1 serving of whole grain chips (see notes) + 2 slices thin sliced whole grain bread; 2 slices 100-calorie bread; 1 serving of Beanito chips + ½ cup cooked whole grain starch; 1 cup cooked beans; ½ cup cooked beans + 1/3 cup cooked brown rice, etc  The rest of your plate will consist of 4-5 oz lean protein + unlimited non-starchy veggies  Meal Examples:  Salad: Unlimited non-starchy veggies + 5 oz lean protein + 2-3 salad add-ins (see notes below) + 1 cup fruit  Taco's/fajita's: 1 corn tortilla + ¼ cup cooked beans + 1/3 cup cooked brown rice + 4 oz lean protein + any non-starchy veggies  Spaghetti & meat sauce: 1 cup cooked lentil pasta + 5 oz 93/7 lean ground  beef + side of non-starchy veggies  5 oz lean protein + any non-starchy veggies + ½ cup cooked potatoes + ½ cup cooked peas or corn   Restaurants: See tips in notes, particularly the Pick 1 out of the 4 rule  Fast Food: See FUELING WELL on-the-go guide     Snack: ~1 serving of carbs = 15-25 grams + at least 20 grams lean protein/heart healthy fat + unlimited amounts of non-starchy vegetables  Flavored Greek yogurt (See notes for brands)  5 oz 2% plain Greek yogurt + optional Truvia packet for sweetness + ½ cup fruit of your choice + optional 2 tablespoons low sugar granola (see notes for brands)  Homemade peanut butter crackers: 1 serving of 100% whole grain crackers (see notes) + 2 tablespoons peanut butter  [place] balance break + piece of fruit  ¼ cup nuts + piece of fruit  Piece of fruit + 2 tablespoons regular nut butter  Note: If banana, then do ½ a medium/large  1 serving of Beanito chips + 1, 100-calorie wholly guacamole packet  1 serving of Beanito chips + ¼ cup shredded cheese melted on top (aka better-for-you andreia's)  Granola/protein bar (see notes for brands)  Avocado toast: ½ small [place] avocado on 1 slice 100% whole grain toast or rice cake      Dinner: Lower carb  Note: If you're craving carbohydrates, then have 1 serving of carbs = 15-20 grams = ½ cup cooked whole grain starch   Focus on 5 oz lean protein + 1 cup or more non-starchy veggies + small amount of heart healthy fat  Carb swaps:  Hearts of palm-based 'linguini'  Brand example: Palmini   Lasagna made with Palmini lasagna sheets à this one comes in a can  'Riced cauliflower' à Can make from scratch + available in convenient frozen Steamables as well as shelf stable pouches  Cauliflower mash to mimic mashed potatoes  'Riced broccoli'   Zoodles (I don't recommend buying these frozen---they get watery---I recommend spiralizing yourself)  Spiralized beets  Spaghetti squash   Outer Aisle Plantpower à makes pre-made frozen cauliflower zienaba  crust & wraps as well as frozen sandwich thins  Great for making low carb pizzas, burgers and sandwiches  Alex'flour frozen flatbreads and pizza crustsà any flavor   Great for making low carb pizzas, burgers, and sandwiches    Think outside the box for low carb dishes:  Kabobs, stir dubon with riced cauliflower or riced broccoli, stuffed bell peppers with no rice, lettuce wraps, eggplant lasagna, shrimp etouffee made with riced cauliflower, request a sushi roll that contains raw fish to be made without rice, etc  Quest pizza à low carb and high protein frozen pizza. Add additional side of non-starchy veggies      Optional post-dinner sweet: Anything up to 200 calories  'Fun size'                    Building A Better Smoothie  Choose your protein. Pick 1 from the followin oz 2% Plain Greek yogurt  5 oz 2% Cottage cheese  Plant-based protein powder  Orgain  Sunwarrior  Monge  Garden [employer] Life RAW  100% whey protein powder  2 scoops Collagen Peptides (Vital Proteins is a brand favorite)  Make it sweet:  Choose 1 cup frozen or fresh fruit of your choice to [place]arna up your smoothie  Make sure you are choosing frozen fruit with no sugar added; be sure to look at the ingredient list  Add your veggies:  Instead of ice, choose frozen cauliflower florets. Cauliflower helps with the detoxification process in our bodies, and it's virtually tasteless!  Greens: spinach, kale, or other leafy greens  Beets are a great addition to smoothies, especially paired with strawberries and lemon  Cucumbers and celery are refreshing ways to enhance nutrition and hydration within your smoothie  Add in a plant-based fat:   Nut Butter (1 teaspoon - 1 tablespoon)  Natural peanut butter  Robinson butter  Cashew butter  Nuts: ~2 tablespoons   Avocado (¼)  Avocado oil  EVOO  Add a liquid to reach your desired consistency:  Unsweetened/No sugar added plant-based milk   Robinson milk, Hemp milk, Cashew milk, Coconut milk, Rice milk, Soy milk  Milk: 1%  or 2%  Water   Healthy add-ins for an extra nutritional boost:  1 tablespoon flaxseeds or karyn seeds        Restaurant Tips      Pick 1 out of the 4 Rule: Instead of eating bread/tortilla chips, an appetizer, alcoholic drink and dessert, choose just one to have with your entrée  Focus on lean proteins: Refer to lean meat/meat substitutes page in meal planning guidebook. Select items grilled, baked, broiled, braised, poached or roasted      For your heart health, avoid crispy, crunchy, breaded, paneed or stuffed items and items that are cream based, au gratin or buttered      Order sauces, dressings, and gravies on the side. This way you can add 1-2 tablespoons yourself. This helps with portion control     Request extra non-starchy vegetables instead of a starchy side dish. If the starchy side is something you love, consider splitting it with someone else at the table     Beverages: Order water with lemon, sparkling water, or unsweetened tea. Avoid sugary soft drinks, juices and mixers    Dining Out     [facility]  Designed to take the guesswork out of dining out healthfully, [facility] makes the healthy choice the easy choice  Visit    Order the Eat Fit Cookbook to create restaurant quality dishes at home  Download the [facility] gautam for free on your smartphone  All Eat Fit restaurants & dishes by location   Nutrition facts for every Eat Fit dish  200 + Eat Fit approved recipes  Grocery shopping guides + community wellness resources    - Ordering A Better-For-You Salad:  Look for lean protein, unlimited non-starchy vegetables, and plant-based fats  Order dressings on the side to better control portion sizes. Add 1-2 tablespoons yourself to lightly coat  Pick 2-3 salad add-ins, each being ~2 tablespoons:  Dressing  Cheese  Nuts  Santiago  Croutons  Dried Fruit  Avocado/Guacamole  Eggs        Additional Nutrition Tips    -Rule of thumb for Aisle products: (Food products located in the center  aisles of grocery stores):   Pranav #7 Rule  Look for products that have 7 grams or less added sugar, and at least 7 grams or more protein    -Frozen Meal Guidelines:   Aim for meals that contain 45 grams or less of carbohydrates and 20 grams or more protein  Note: If you find one you like that doesn't have 20 grams protein, you can add leftover lean protein to the frozen meal  Eating Well and Healthy Choice POWER Bowls are two of my favorite brands  Refer to the Eat Fit Shopping Guide for additional brand specific recommendations    -Choose 100% whole wheat/whole grain products   -Note: 'wheat' bread and 'wheat' flour are white bread/white flour    -Unsweetened frozen fruits and veggies contain the same nutritional value as fresh fruit and veggies. I recommend keeping these as staples in your freezer     -Taking the skin off fried chicken is basically the same as eating grilled chicken  Compromise: If the skin is your favorite part, then eat your first piece of fried chicken without skin, and your last piece with skin  Note: Fried foods shouldn't be consumed more than twice a month    -Training/retraining the body is key, and may take some time:   Whatever you are currently doing for your meals/snacks and exercise routine, your body is used to it  When changing a habit(s), it's normal for it to be a little hard at first. Your body gets used to new habits over time with consistency  Exercise/Movement: Aim for at least 60 minutes of moderate exercise 5 days a week.   Note: Start off with small goals if having a hard time getting back on an exercise routine. Even a daily 15-minute walk adds up initially. Increase the length of time you exercise gradually until you're able to do 60 minutes most days of the week    -Water is not only important for hydration, but also for feeling alert and more energized. If our body is even slightly dehydrated, that could make us feel more fatigued throughout the day  Aim for all  daily fluids being half your body weight in oz + sweat loss (see under Energy Requirements at the top of page 1 for your daily total fluid intake)    -I recommend measuring everything in cooked portions to see what each recommended portion looks like on your plate and bowls; Then eyeball after you feel comfortable with recommended portions    -What potentially increases inflammation/flare-ups in our body?  White/refined carbohydrates  Sugar  Alcohol  Fried foods    -If you're having trouble finding a specific food product, try looking on the brands website. Most companies have a 'store /find a store' on their website        Favorite Food Brands    - Whole Grain Breads:  Sebas's Killer Bread - 21 Whole Grains and Seeds (green label), Good Seed (yellow label), Power Seed (red label)   Thin sliced -or- regular slice  Sundar Bread - all varieties, in freezer section  Base Culture Bread - 7 Nut + Seed and Original Paleo Break (GF and found in freezer section)  Per slice: 110 calories and 4 grams net carbs  Great to use for dinner since low carb    -Whole Grain Tortillas  Wraps:  Corn   Coconut wrap - (Typically found in refrigerated section by cheese)  BARB VIVIAN - 100% whole wheat   [place] - Whole wheat tortillas + whole wheat carb balance    -Whole Grain Pancakes  Waffles:  Robinson Cake 100% whole grain pancake/waffle mix (my favorite)  Flapjacked- Protein pancake & baking mix   Flapjacked - Mighty Muffin (typically found on baking aisle)  Swerve Sweetener- Pancake & Waffle mix    -Whole Grain Frozen Waffles:  Kashi GO Protein Waffles  Robinson Cakes - Power Waffles + Pancakes (plain)  Van's Power Grains Original    -Frozen Breakfast Sandwiches  Bowls:  Santana MCGRATH-Light on English muffin  GoodFood (egg white vance breakfast sandwich)  SweetEarth (comes on a whole grain flatbread breakfast sandwich)  Realgood - Breakfast Sandwiches on Cauliflower Cheesy Bread  EVOL morning bowls    EVOL lean and fit options         -Better-For-You Pasta:  Banza chickpea pasta - all varieties   Red Lentil or Yellow Lentil pasta  Black bean pasta (aka squid ink pasta)  Edamame-based pasta  [place] gluten free quinoa pasta (found on gluten free aisle. This brand tastes like white pasta, but is a whole grain)    - Red Sauce in a Jar:  Casey & Kathie's Heart Smart Sauce (available flavors: Roasted Garlic, [place] or Original)  Radiation Watch Finest Gourmet Foods Pasta Sauce  Classico Original  Engine 2 Plant-Strong      -Whole Grain Cereals:  Special K PROTEIN  Premier Protein (20-gram protein cereal à available at your typical grocery store)  Kashi Go Grain Free  Dark Cocoa + Cinnamon Vanilla  Cammy Crunch Keto-Friendly Cereals  Three Wishes  Magic Spoon Grain-Free Cereals (online only)    -100% Whole Grain Chips:  SunChips  Beanito's  [place]fields Bean Chips  Popcorners- FLEX Protein Crisps (10 grams protein per serving)  Garden of Eatin - Blue Corn Chips    -100% Whole Grain Crackers:  Triscuits - Regular + thin crisps  Wheat Thins  Blue Narcisa almond nut thins  Kathryn's Gone Cracker  Whisps Parmesan Cheese Crisps (contains only 1 gram carbohydrate per serving)  Crunchmaster protein sea salt cracker    -Better-For-You Ice Cream  Ice Cream Bars:  Halo Top high protein ice cream pints - regular and keto series  Artic Zero Fit Frozen Dessert  Halo Top Pops  Realgood ice cream  Enlightened ice cream bar  Enlightened 'Light' ice cream  Yasso Frozen Greek yogurt bar    -Protein  Granola Bars that Meet the Pranav #7 Rule:  Nature Valley Protein Chewy (GF)  Kashi Grain Free Coconut [place]  Kashi Go Protein Bar (GF)  KIND Protein + KIND Bars (with 7 grams sugar or less) (GF)  Rx Bar (GF)  Rx Layers Bars (GF)  Oatmega Bars    -Nut [place] & Jellies:  Sacha's Nut [place]  Nuts 'N More Nut [place]  MaraNatha Nut [place]  Wild Friends Protein Nut [place]  SunButter, unsweetened  Smart Balance- Natural PB  Jelly: Polaner's All Fruit NSA (no sugar  added)    -BBQ Sauce:   SAMI all natural   Primal Kitchen Classic BBQ sauce    -Salad Dressings:  Randee's Salad Dressing: Sensation, Balsamic, Strawberry, Avocado, Caesar, Creole Ranch, Sweet Creole Mustard, Garlic & Red Wine   Primal Kitchen- all varieties    Miller's Own - Balsamic Vinaigrette, Classic Oil & Vinegar     -Granola: [Note: granola should be used as a topper for a crunch, and not as a main meal]  ProGranola by GetTaxi  Engine 2 Plant Strong  Good Granoly Health Nut    -Milk:  Favorite brands, which are also lactose free, that contain half the sugar & double the protein + lasts ~2 months in your fridge compared to regular milk:  Fairlife milk (skim, 1% or 2%)  Organic Valley ULTRA reduced fat milk     -Already Flavored Greek Yogurt:  Chobani Less Sugar  Oikos Triple Zero  Two Good - All varieties     -Plant-Based High Protein Yogurt:  [place] - unsweetened Greek style yogurt, plain (Dairy free)  [place]- almond milk yogurt, vanilla or plain, unsweetened (Dairy free)  Siggi's Coconut Blend Yogurt      -Brown Rice: Any brand that makes a '10-minute boil in a bag' brown rice  Brand examples: Success  Uncle Bens    -Vital Proteins Collage Peptides, unflavored: Great to have as a staple in your pantry. You can add to soups, hummus, coffee, etc to make higher protein  -Allulose: 0-calorie all-natural plant-based sweetener that tastes like Splenda. Comes in granulated and liquid form    -Supplements:  Vitamin D3: 1,000 - 2,000 i.u per day   Fish Oil: Look for at least 1200 mg EPA + DHA per 2 capsules  My favorite brand is healthfinch's Ultimate Omega  One-A-Day MVI  Brand example: [facility]'s Women's Multivitamin Ultra [place]e    -To reschedule a follow-up appointment, please call [facility] at [phone]

## 2023-02-01 DIAGNOSIS — Z12.31 SCREENING MAMMOGRAM, ENCOUNTER FOR: Primary | ICD-10-CM

## 2023-03-06 ENCOUNTER — HOSPITAL ENCOUNTER (OUTPATIENT)
Dept: RADIOLOGY | Facility: HOSPITAL | Age: 58
Discharge: HOME | End: 2023-03-06
Attending: NURSE PRACTITIONER
Payer: MEDICAID

## 2023-03-06 DIAGNOSIS — Z12.31 SCREENING MAMMOGRAM, ENCOUNTER FOR: ICD-10-CM

## 2023-03-06 PROCEDURE — 77063 MAMMO DIGITAL SCREENING BILAT WITH TOMO: ICD-10-PCS | Mod: 26,,, | Performed by: RADIOLOGY

## 2023-03-06 PROCEDURE — 77067 SCR MAMMO BI INCL CAD: CPT | Mod: 26,,, | Performed by: RADIOLOGY

## 2023-03-06 PROCEDURE — 77063 BREAST TOMOSYNTHESIS BI: CPT | Mod: 26,,, | Performed by: RADIOLOGY

## 2023-03-06 PROCEDURE — 77067 SCR MAMMO BI INCL CAD: CPT | Mod: TC

## 2023-03-06 PROCEDURE — 77067 MAMMO DIGITAL SCREENING BILAT WITH TOMO: ICD-10-PCS | Mod: 26,,, | Performed by: RADIOLOGY

## 2023-06-19 ENCOUNTER — PROCEDURE VISIT (OUTPATIENT)
Dept: HEPATOLOGY | Facility: CLINIC | Age: 58
End: 2023-06-19
Payer: MEDICAID

## 2023-06-19 ENCOUNTER — OFFICE VISIT (OUTPATIENT)
Dept: HEPATOLOGY | Facility: CLINIC | Age: 58
End: 2023-06-19
Payer: MEDICAID

## 2023-06-19 ENCOUNTER — TELEPHONE (OUTPATIENT)
Dept: ENDOSCOPY | Facility: HOSPITAL | Age: 58
End: 2023-06-19
Payer: MEDICAID

## 2023-06-19 VITALS — HEIGHT: 61 IN | WEIGHT: 168.88 LBS | BODY MASS INDEX: 31.88 KG/M2

## 2023-06-19 DIAGNOSIS — R16.0 HEPATOMEGALY: ICD-10-CM

## 2023-06-19 DIAGNOSIS — K85.10 ACUTE BILIARY PANCREATITIS, UNSPECIFIED COMPLICATION STATUS: ICD-10-CM

## 2023-06-19 DIAGNOSIS — K76.0 HEPATIC STEATOSIS: ICD-10-CM

## 2023-06-19 DIAGNOSIS — I10 HYPERTENSION, UNSPECIFIED TYPE: ICD-10-CM

## 2023-06-19 DIAGNOSIS — K86.2 PANCREATIC CYST: Primary | ICD-10-CM

## 2023-06-19 DIAGNOSIS — K86.2 PANCREAS CYST: ICD-10-CM

## 2023-06-19 DIAGNOSIS — K76.0 HEPATIC STEATOSIS: Primary | ICD-10-CM

## 2023-06-19 DIAGNOSIS — R74.8 ELEVATED LIVER ENZYMES: ICD-10-CM

## 2023-06-19 DIAGNOSIS — E78.5 HYPERLIPIDEMIA, UNSPECIFIED HYPERLIPIDEMIA TYPE: ICD-10-CM

## 2023-06-19 DIAGNOSIS — E66.9 CLASS 1 OBESITY WITH BODY MASS INDEX (BMI) OF 31.0 TO 31.9 IN ADULT, UNSPECIFIED OBESITY TYPE, UNSPECIFIED WHETHER SERIOUS COMORBIDITY PRESENT: ICD-10-CM

## 2023-06-19 DIAGNOSIS — R73.03 PREDIABETES: ICD-10-CM

## 2023-06-19 PROBLEM — K82.9 DISORDER OF GALLBLADDER: Status: ACTIVE | Noted: 2018-12-27

## 2023-06-19 PROBLEM — R06.00 DYSPNEA: Status: ACTIVE | Noted: 2023-03-14

## 2023-06-19 PROBLEM — K21.9 GASTRO-ESOPHAGEAL REFLUX DISEASE WITHOUT ESOPHAGITIS: Status: ACTIVE | Noted: 2017-05-01

## 2023-06-19 PROBLEM — R00.2 PALPITATIONS: Status: ACTIVE | Noted: 2023-03-14

## 2023-06-19 PROBLEM — N95.9 MENOPAUSAL AND POSTMENOPAUSAL DISORDER: Status: ACTIVE | Noted: 2019-07-22

## 2023-06-19 PROBLEM — E66.811 CLASS 1 OBESITY WITH BODY MASS INDEX (BMI) OF 31.0 TO 31.9 IN ADULT: Status: ACTIVE | Noted: 2023-06-19

## 2023-06-19 PROBLEM — K85.90 PANCREATITIS: Status: ACTIVE | Noted: 2018-06-18

## 2023-06-19 PROCEDURE — 76981 FIBROSCAN (VIBRATION CONTROLLED TRANSIENT ELASTOGRAPHY): ICD-10-PCS | Mod: 26,S$PBB,, | Performed by: NURSE PRACTITIONER

## 2023-06-19 PROCEDURE — 1159F MED LIST DOCD IN RCRD: CPT | Mod: CPTII,,, | Performed by: NURSE PRACTITIONER

## 2023-06-19 PROCEDURE — 99999 PR PBB SHADOW E&M-EST. PATIENT-LVL III: CPT | Mod: PBBFAC,,, | Performed by: NURSE PRACTITIONER

## 2023-06-19 PROCEDURE — 1160F PR REVIEW ALL MEDS BY PRESCRIBER/CLIN PHARMACIST DOCUMENTED: ICD-10-PCS | Mod: CPTII,,, | Performed by: NURSE PRACTITIONER

## 2023-06-19 PROCEDURE — 1160F RVW MEDS BY RX/DR IN RCRD: CPT | Mod: CPTII,,, | Performed by: NURSE PRACTITIONER

## 2023-06-19 PROCEDURE — 99999 PR PBB SHADOW E&M-EST. PATIENT-LVL III: ICD-10-PCS | Mod: PBBFAC,,, | Performed by: NURSE PRACTITIONER

## 2023-06-19 PROCEDURE — 1159F PR MEDICATION LIST DOCUMENTED IN MEDICAL RECORD: ICD-10-PCS | Mod: CPTII,,, | Performed by: NURSE PRACTITIONER

## 2023-06-19 PROCEDURE — 99214 PR OFFICE/OUTPT VISIT, EST, LEVL IV, 30-39 MIN: ICD-10-PCS | Mod: S$PBB,,, | Performed by: NURSE PRACTITIONER

## 2023-06-19 PROCEDURE — 99213 OFFICE O/P EST LOW 20 MIN: CPT | Mod: PBBFAC | Performed by: NURSE PRACTITIONER

## 2023-06-19 PROCEDURE — 3008F BODY MASS INDEX DOCD: CPT | Mod: CPTII,,, | Performed by: NURSE PRACTITIONER

## 2023-06-19 PROCEDURE — 3008F PR BODY MASS INDEX (BMI) DOCUMENTED: ICD-10-PCS | Mod: CPTII,,, | Performed by: NURSE PRACTITIONER

## 2023-06-19 PROCEDURE — 76981 USE PARENCHYMA: CPT | Mod: 26,S$PBB,, | Performed by: NURSE PRACTITIONER

## 2023-06-19 PROCEDURE — 4010F ACE/ARB THERAPY RXD/TAKEN: CPT | Mod: CPTII,,, | Performed by: NURSE PRACTITIONER

## 2023-06-19 PROCEDURE — 99214 OFFICE O/P EST MOD 30 MIN: CPT | Mod: S$PBB,,, | Performed by: NURSE PRACTITIONER

## 2023-06-19 PROCEDURE — 91200 LIVER ELASTOGRAPHY: CPT | Mod: PBBFAC | Performed by: NURSE PRACTITIONER

## 2023-06-19 PROCEDURE — 4010F PR ACE/ARB THEARPY RXD/TAKEN: ICD-10-PCS | Mod: CPTII,,, | Performed by: NURSE PRACTITIONER

## 2023-06-19 NOTE — PROGRESS NOTES
[facility] VISIT ESTABLISHED PT NOTE    REFERRING PROVIDER:  No ref. provider found    CHIEF COMPLAINT: Elevated Liver Enzymes/Fatty Liver    HPI: This is a 57 y.o. White female with PMH noted below, presenting for follow up for elevated liver enzymes and fatty liver. She was last seen in clinic by myself in 2022.    The patient's risk factors for NAFLD/ELDRIDGE include:     Obesity                                        Yes; BMI 31.91  Dyslipidemia                                Yes - Well controlled on low dose Crestor  Insulin resistance/Diabetes         Yes - on low dose Metformin  Family history of diabetes            Yes     Labs in 2021 showed an AST of 100, ALT of 97, with normal synthetic function. Repeat labs in 2021 showed an ALT of 95, AST of 73. Prior imaging has shown hepatomegaly (18.5 cm) and hepatic steatosis. She also has pancreatic cysts, followed by Dr. Maged Cedillo. She had an EUS in 2020, and is overdue for follow up CT scan.   She has a strong family history of NAFLD/ELDRIDGE. One of her maternal aunts  of liver cancer. She denies any history of heavy alcohol use or illicit drug use. She is immune to Hepatitis A. She is S/P vaccination for Hepatitis B. Fibroscan to stage liver disease in 2021 was suggestive of significant hepatic steatosis, with F0-F1 fibrosis, and a low likelihood of cirrhosis. She has lost 7 pounds over the past 3 years. Follow up Fibroscan today is suggestive of significant hepatic steatosis, with F0-F1 fibrosis, and a low likelihood of cirrhosis. She is well appearing and has no jaundice, dark urine, pruritus, abdominal distention, hematemesis, melena, or periods of confusion suggestive of hepatic encephalopathy.      Review of patient's allergies indicates:  No Known Allergies    Current Outpatient Medications on File Prior to Visit   Medication Sig Dispense Refill    losartan (COZAAR) 100 MG tablet Take by mouth.      metFORMIN (GLUCOPHAGE) 500 MG  "tablet Take 500 mg by mouth once daily.      OMEPRAZOLE ORAL Take by mouth.      rosuvastatin (CRESTOR) 20 MG tablet Take by mouth.       No current facility-administered medications on file prior to visit.     PMHX:  has a past medical history of Elevated liver enzymes, Fatty liver, Hepatomegaly, Hyperlipidemia, and Hypertension.    PSHX:  has a past surgical history that includes  section (); Endoscopic ultrasound of upper gastrointestinal tract (N/A, 7/3/2018); Esophagogastroduodenoscopy (N/A, 7/3/2018); Endoscopic ultrasound of upper gastrointestinal tract (N/A, 10/9/2019); and Endoscopic ultrasound of upper gastrointestinal tract (N/A, 2020).    FAMILY HISTORY: Negative for liver disease, reviewed in EPIC    SOCIAL HISTORY:   Social History     Tobacco Use   Smoking Status Never   Smokeless Tobacco Never     Social History     Substance and Sexual Activity   Alcohol Use No     Social History     Substance and Sexual Activity   Drug Use No     ROS:   GENERAL: Denies fever, chills, weight loss/gain, fatigue  HEENT: Denies headaches, dizziness, vision/hearing changes  CARDIOVASCULAR: Denies chest pain, palpitations, or edema  RESPIRATORY: Denies dyspnea, cough  GI: Denies abdominal pain, rectal bleeding, nausea, vomiting. No change in bowel pattern or color  : Denies dysuria, hematuria   SKIN: Denies rash, itching   NEURO: Denies confusion, memory loss, or mood changes  PSYCH: Denies depression or anxiety  HEME/LYMPH: Denies easy bruising or bleeding    PHYSICAL EXAM:   Friendly White female, in no acute distress; alert and oriented to person, place and time  VITALS: Ht 5' 1" (1.549 m)   Wt 76.6 kg (168 lb 14 oz)   LMP  (LMP Unknown)   BMI 31.91 kg/m²   HENT: Normocephalic, without obvious abnormality.   EYES: Sclerae anicteric.   NECK: No obvious masses.  CARDIOVASCULAR: No peripheral edema.  RESPIRATORY: Normal respiratory effort.  GI: Soft, non-tender, non-distended, obese " abdomen.  EXTREMITIES:  No clubbing, cyanosis or edema.  SKIN: Warm and dry. No jaundice. No rashes noted to exposed skin.   NEURO:  Normal gait. No asterixis.  PSYCH:  Memory intact. Thought and speech pattern appropriate. Behavior normal. No depression or anxiety noted.    DIAGNOSTIC STUDIES:    OUTSIDE LABS:                 MRI ABDOMEN W/W/O CONTRAST W/MRCP 8/18/2020:    FINDINGS:    The liver is enlarged.  Hepatic parenchyma demonstrates diffuse signal dropout on out of phase imaging in keeping with steatosis.  There is a 0.7 cm T2 hyperintense lesion within segment 3 that demonstrates homogeneous enhancement on early arterial phase that persist on portal venous and delayed phases, findings that are consistent with a flash filling hemangioma.  No intrahepatic bile duct dilatation.  Portal vasculature is patent.     Gallbladder is surgically absent.  Common bile duct is dilated measuring 1 cm in maximum dimension with tapering at the level of the ampulla.  No intraductal filling defects.     Stomach, duodenum, spleen and adrenal glands are within normal limits.     There has been interval decreased size of the previous pancreatic cystic lesion now measuring 1.3 cm (series 1500, image 67 previously 2.3 cm).  There is a new 1.9 cm rim enhancing T2 hypointense, T1 isointense lesion at the level of the pancreatic tail, presumably a complex cyst.  No pancreatic ductal dilatation.  Remaining pancreatic parenchyma demonstrates homogeneous enhancement without focal lesions.  No peripancreatic inflammatory changes.     Kidneys are normal in size and location.  Subcentimeter T2 hyperintense nonenhancing cortical lesions noted within both kidneys, too small to accurately characterize but favored to represent cysts.  No hydronephrosis or proximal hydroureter.     The small bowel is normal in caliber.  Visualized colon is unremarkable.  The appendix is normal.     The abdominal aorta tapers normally.  IVC is patent.     No  abdominal lymphadenopathy.  No focal mesenteric masses.  No omental or peritoneal implants.     Subcutaneous soft tissues are within normal limits.  No marrow signal abnormality to suggest an infiltrative process.     Impression:     1. Interval decreased size of the previously identified pancreatic cystic lesion at the level of the body/tail which demonstrated characteristics consistent with pseudocyst on EUS dated 10/09/2019.  2. Interval development of a new rim enhancing lesion at the level of the pancreatic tail, favored to represent a complex (proteinaceous versus hemorrhagic) pseudocyst.  A necrotic solid neoplasm is possible but felt to be less likely.  Further characterization with EUS should be considered.  3. Hepatomegaly and hepatic steatosis.  4. Subcentimeter left hepatic lobe enhancing lesion, likely a flash filling hemangioma.  This report was flagged in Epic as abnormal.    FIBROSCAN 6/22/2021:    Findings  Median liver stiffness score:  6.8  CAP Reading: dB/m:  371     IQR/med %:  7  Interpretation  Fibrosis interpretation is based on medial liver stiffness - Kilopascal (kPa).     Fibrosis Stage:  F 0-1  Steatosis interpretation is based on controlled attenuation parameter - (dB/m).     Steatosis Grade:  S3    FIBROSCAN 6/19/2023:    Findings  Median liver stiffness score:  5.1  CAP Reading: dB/m:  400     IQR/med %:  14  Interpretation  Fibrosis interpretation is based on medial liver stiffness - Kilopascal (kPa).     Fibrosis Stage:  F 0-1  Steatosis interpretation is based on controlled attenuation parameter - (dB/m).     Steatosis Grade:  S3    EDUCATION:  Per AVS.    ASSESSMENT & PLAN:  57 y.o. White female with:    1. Hepatic steatosis        2. Hepatomegaly        3. Elevated liver enzymes        4. Class 1 obesity with body mass index (BMI) of 31.0 to 31.9 in adult, unspecified obesity type, unspecified whether serious comorbidity present        5. Prediabetes        6. Hyperlipidemia,  unspecified hyperlipidemia type        7. Hypertension, unspecified type        8. Pancreas cyst        9. History of acute biliary pancreatitis, unspecified complication status            - Repeat Fibroscan to restage liver disease in 1 year.  - Recommend an Ultrasound of the liver every 3 years.  - Will send message to Dr. Cedillo to arrange f/u CT.  - Repeat liver function tests every 6 months, through PCP.  - Recommend additional weight loss of 15 lbs, through diet and exercise.  - Recommend good control of cholesterol, blood pressure, & blood sugar levels.    Follow up in about 1 year (around 6/19/2024). with repeat Fibroscan prior to visit.    Thank you for allowing me to participate in the care of [facility]       Hepatology Nurse Practitioner  [facility]    CC'ed note to:   No ref. provider found  HAY Radford

## 2023-06-19 NOTE — Clinical Note
Good Morning - Please contact patient to arrange overdue CT and follow up visit with Dr. Cedillo to monitor pancreatic cyst. Thanks!

## 2023-06-19 NOTE — PATIENT INSTRUCTIONS
1. Fibroscan today to assess for fat and scar tissue in the liver  2. Recommend an Ultrasound of the liver every 3 years.  3. Repeat liver function tests in 6 months through PCP.   4. Avoid alcohol and herbal supplements/alternative remedies.  5. Return to clinic in 1 year.    There is no FDA approved therapy for non-alcoholic fatty liver disease. Therefore, these things are important:  1. Weight loss goal of 15 lbs.  2. Low carb/sugar, high fiber and protein diet.Try to limit your carb intake to LESS than 30-45 grams of carbs with a meal or LESS than 5-10 grams with any snack (total of any snack foods eaten during that time). Use MyFitness Pal gautam to add up your carbs through the day. Do NOT drink any beverages with calories or carbs (this can lead to high blood sugar and weight gain). Also, some of our patients have been very successful with weight loss using the pre made/planned meal planning services that limit calories and portion size (one example is Sensible Meals but there are many out there).  3. Exercise, 5 days per week, 30 minutes per day, as tolerated.  4. Recommend good control of cholesterol, blood pressure, & blood sugar levels.    In some people, fatty liver can progress to steatohepatitis (inflamatory fatty liver) and possibly to cirrhosis, putting one at increased risk for liver cancer, liver failure, and death. Therefore, the lifestyle changes are very important to decrease this risk.     Website with information about fatty liver and inflammation related to fatty liver (ELDRIDGE) = www.nashtruth.com  AND [email or web address].com

## 2023-06-19 NOTE — PROCEDURES
FibroScan (Vibration Controlled Transient Elastography)    Date/Time: 6/19/2023 8:00 AM  Performed by: Rosemary Macdonald NP  Authorized by: Rosemary Macdonald NP     Diagnosis:  NAFLD    Probe:  XL    Universal Protocol: Patient's identity, procedure and site were verified, confirmatory pause was performed.  Discussed procedure including risks and potential complications.  Questions answered.  Patient verbalizes understanding and wishes to proceed with VCTE.     Procedure: After providing explanations of the procedure, patient was placed in the supine position with right arm in maximum abduction to allow optimal exposure of right lateral abdomen.  Patient was briefly assessed, Testing was performed in the mid-axillary location, 50Hz Shear Wave pulses were applied and the resulting Shear Wave and Propagation Speed detected with a 3.5 MHz ultrasonic signal, using the FibroScan probe, Skin to liver capsule distance and liver parenchyma were accessed during the entire examination with the FibroScan probe, Patient was instructed to breathe normally and to abstain from sudden movements during the procedure, allowing for random measurements of liver stiffness. At least 10 Shear Waves were produced, Individual measurements of each Shear Wave were calculated.  Patient tolerated the procedure well with no complications.  Meets discharge criteria as was dismissed.  Rates pain 0 out of 10.  Patient will follow up with ordering provider to review results.    Findings  Median liver stiffness score:  5.1  CAP Reading: dB/m:  400    IQR/med %:  14  Interpretation  Fibrosis interpretation is based on medial liver stiffness - Kilopascal (kPa).    Fibrosis Stage:  F 0-1  Steatosis interpretation is based on controlled attenuation parameter - (dB/m).    Steatosis Grade:  S3

## 2024-01-22 DIAGNOSIS — Z12.31 SCREENING MAMMOGRAM, ENCOUNTER FOR: Primary | ICD-10-CM

## 2024-03-07 ENCOUNTER — HOSPITAL ENCOUNTER (OUTPATIENT)
Dept: RADIOLOGY | Facility: HOSPITAL | Age: 59
Discharge: HOME | End: 2024-03-07
Payer: MEDICAID

## 2024-03-07 DIAGNOSIS — Z12.31 SCREENING MAMMOGRAM, ENCOUNTER FOR: ICD-10-CM

## 2024-03-07 PROCEDURE — 77063 BREAST TOMOSYNTHESIS BI: CPT | Mod: 26,,, | Performed by: RADIOLOGY

## 2024-03-07 PROCEDURE — 77067 SCR MAMMO BI INCL CAD: CPT | Mod: TC

## 2024-03-07 PROCEDURE — 77067 SCR MAMMO BI INCL CAD: CPT | Mod: 26,,, | Performed by: RADIOLOGY

## 2024-04-05 ENCOUNTER — TELEPHONE (OUTPATIENT)
Dept: HEPATOLOGY | Facility: CLINIC | Age: 59
End: 2024-04-05
Payer: MEDICAID

## 2024-04-05 NOTE — TELEPHONE ENCOUNTER
Contacted patient to reschedule appointments. No answer. Left VM stating the new appointments date and time. Mailed appointment reminder to patient.

## 2024-05-03 ENCOUNTER — TELEPHONE (OUTPATIENT)
Dept: HEPATOLOGY | Facility: CLINIC | Age: 59
End: 2024-05-03
Payer: MEDICAID

## 2024-05-03 NOTE — TELEPHONE ENCOUNTER
"----- Message from Fercho Sampson sent at 5/3/2024  9:07 AM CDT -----  Regarding: call back        Reschedule Existing Appointment        Appt Date:05/10     Type of appt: All Appts     Physician:     Reason for rescheduling?  Requesting to r/s for soonest available     Caller: Self     Contact Preference:[phone]        Additional Information:  "Thank you for all that you do for our patients"  "

## 2024-05-10 ENCOUNTER — OFFICE VISIT (OUTPATIENT)
Dept: HEPATOLOGY | Facility: CLINIC | Age: 59
End: 2024-05-10
Payer: MEDICAID

## 2024-05-10 ENCOUNTER — PROCEDURE VISIT (OUTPATIENT)
Dept: HEPATOLOGY | Facility: CLINIC | Age: 59
End: 2024-05-10
Payer: MEDICAID

## 2024-05-10 VITALS — WEIGHT: 171.5 LBS | HEIGHT: 61 IN | BODY MASS INDEX: 32.38 KG/M2

## 2024-05-10 DIAGNOSIS — E11.9 TYPE 2 DIABETES MELLITUS WITHOUT COMPLICATION, WITHOUT LONG-TERM CURRENT USE OF INSULIN: ICD-10-CM

## 2024-05-10 DIAGNOSIS — R16.0 HEPATOMEGALY: ICD-10-CM

## 2024-05-10 DIAGNOSIS — E78.5 HYPERLIPIDEMIA, UNSPECIFIED HYPERLIPIDEMIA TYPE: ICD-10-CM

## 2024-05-10 DIAGNOSIS — R74.8 ELEVATED LIVER ENZYMES: ICD-10-CM

## 2024-05-10 DIAGNOSIS — Z87.19 HISTORY OF PANCREATITIS: ICD-10-CM

## 2024-05-10 DIAGNOSIS — I10 HYPERTENSION, UNSPECIFIED TYPE: ICD-10-CM

## 2024-05-10 DIAGNOSIS — R73.03 PREDIABETES: ICD-10-CM

## 2024-05-10 DIAGNOSIS — K74.01 EARLY HEPATIC FIBROSIS: ICD-10-CM

## 2024-05-10 DIAGNOSIS — K76.0 HEPATIC STEATOSIS: ICD-10-CM

## 2024-05-10 DIAGNOSIS — E66.9 CLASS 1 OBESITY WITH BODY MASS INDEX (BMI) OF 31.0 TO 31.9 IN ADULT, UNSPECIFIED OBESITY TYPE, UNSPECIFIED WHETHER SERIOUS COMORBIDITY PRESENT: ICD-10-CM

## 2024-05-10 DIAGNOSIS — K86.2 PANCREAS CYST: ICD-10-CM

## 2024-05-10 DIAGNOSIS — K76.0 HEPATIC STEATOSIS: Primary | ICD-10-CM

## 2024-05-10 PROBLEM — H04.123 BILATERAL DRY EYES: Status: ACTIVE | Noted: 2023-09-12

## 2024-05-10 PROBLEM — Z01.00 ROUTINE EYE EXAM: Status: ACTIVE | Noted: 2023-09-12

## 2024-05-10 PROBLEM — G47.33 OBSTRUCTIVE SLEEP APNEA: Status: ACTIVE | Noted: 2023-07-25

## 2024-05-10 PROCEDURE — 91200 LIVER ELASTOGRAPHY: CPT | Mod: PBBFAC | Performed by: NURSE PRACTITIONER

## 2024-05-10 PROCEDURE — 99999 PR PBB SHADOW E&M-EST. PATIENT-LVL II: CPT | Mod: PBBFAC,,, | Performed by: NURSE PRACTITIONER

## 2024-05-10 PROCEDURE — 99212 OFFICE O/P EST SF 10 MIN: CPT | Mod: PBBFAC | Performed by: NURSE PRACTITIONER

## 2024-05-10 PROCEDURE — 1159F MED LIST DOCD IN RCRD: CPT | Mod: CPTII,,, | Performed by: NURSE PRACTITIONER

## 2024-05-10 PROCEDURE — 3008F BODY MASS INDEX DOCD: CPT | Mod: CPTII,,, | Performed by: NURSE PRACTITIONER

## 2024-05-10 PROCEDURE — 91200 LIVER ELASTOGRAPHY: CPT | Mod: 26,S$PBB,, | Performed by: NURSE PRACTITIONER

## 2024-05-10 PROCEDURE — 4010F ACE/ARB THERAPY RXD/TAKEN: CPT | Mod: CPTII,,, | Performed by: NURSE PRACTITIONER

## 2024-05-10 PROCEDURE — 99214 OFFICE O/P EST MOD 30 MIN: CPT | Mod: S$PBB,,, | Performed by: NURSE PRACTITIONER

## 2024-05-10 NOTE — PROGRESS NOTES
[facility] VISIT ESTABLISHED PT NOTE    REFERRING PROVIDER:  No ref. provider found    CHIEF COMPLAINT: Elevated Liver Enzymes/Fatty Liver    HPI: This is a 58 y.o. White female with PMH noted below, presenting for follow up for elevated liver enzymes and fatty liver. She was last seen in clinic by myself in 2023.    The patient's risk factors for NAFLD/ELDRIDGE include:     Obesity                                        Yes; BMI 32.41 - Net weight gain of 3 lbs - Encouraged patient to follow up with Obesity medicine, as suggested by her PCP (history of pancreatitis, so cannot be prescribed GLP-1 RA).  Dyslipidemia                                Yes - Well controlled on low dose Crestor  Insulin resistance/Diabetes         Yes - Last HgbA1c was 6.5% (2024) - previously 6.1% - PCP increased Metformin from 500 mg daily to 1,000 mg daily.  Family history of diabetes            Yes     Labs in 2021 showed an AST of 100, ALT of 97, with normal synthetic function. Repeat labs in 2021 showed an ALT of 95, AST of 73. Prior imaging has shown hepatomegaly (18.5 cm) and hepatic steatosis. She also has pancreatic cysts, followed by Dr. Maged Cedillo. She had an EUS in 2020, and is well overdue for follow up CT scan.     She has a strong family history of NAFLD/ELDRIDGE. One of her maternal aunts  of liver cancer. She denies any history of heavy alcohol use or illicit drug use. She is immune to Hepatitis A. She is S/P vaccination for Hepatitis B. Fibroscan to stage liver disease in 2021 was suggestive of significant hepatic steatosis, with F0-F1 fibrosis, and a low likelihood of cirrhosis. She has lost 7 pounds since initial visit. Follow up Fibroscan in 2023 was suggestive of significant hepatic steatosis, with F0-F1 fibrosis, and a low likelihood of cirrhosis.     Since last visit she has regained 3 lbs. LFT's in 2024 showed a mildly elevated ALT of 36; the rest of her LFT's were normal. Repeat  Fibroscan today is again suggestive of severe fatty infiltration of the liver (S3), but with F2 fibrosis (moderate scarring), and possible progression in fibrosis. She is well appearing and has no s/s hepatic decompensation including jaundice, dark urine, pruritus, abdominal distention, hematemesis, melena, or periods of confusion suggestive of hepatic encephalopathy.      Review of patient's allergies indicates:  No Known Allergies    Current Outpatient Medications on File Prior to Visit   Medication Sig Dispense Refill    losartan (COZAAR) 100 MG tablet Take by mouth.      metFORMIN (GLUCOPHAGE) 500 MG tablet Take 1,000 mg by mouth daily with breakfast.      rosuvastatin (CRESTOR) 20 MG tablet Take by mouth.      [DISCONTINUED] OMEPRAZOLE ORAL Take by mouth. (Patient not taking: Reported on 5/10/2024)       No current facility-administered medications on file prior to visit.     PMHX:  has a past medical history of Elevated liver enzymes, Fatty liver, Hepatomegaly, Hyperlipidemia, and Hypertension.    PSHX:  has a past surgical history that includes  section (); Endoscopic ultrasound of upper gastrointestinal tract (N/A, 7/3/2018); Esophagogastroduodenoscopy (N/A, 7/3/2018); Endoscopic ultrasound of upper gastrointestinal tract (N/A, 10/9/2019); and Endoscopic ultrasound of upper gastrointestinal tract (N/A, 2020).    FAMILY HISTORY: Negative for liver disease, reviewed in EPIC    SOCIAL HISTORY:   Social History     Tobacco Use   Smoking Status Never   Smokeless Tobacco Never     Social History     Substance and Sexual Activity   Alcohol Use No     Social History     Substance and Sexual Activity   Drug Use No     ROS:   GENERAL: Denies fever, chills, weight loss/gain, fatigue  HEENT: Denies headaches, dizziness, vision/hearing changes  CARDIOVASCULAR: Denies chest pain, palpitations, or edema  RESPIRATORY: Denies dyspnea, cough  GI: Denies abdominal pain, rectal bleeding, nausea, vomiting. No  "change in bowel pattern or color  : Denies dysuria, hematuria   SKIN: Denies rash, itching   NEURO: Denies confusion, memory loss, or mood changes  PSYCH: Denies depression or anxiety  HEME/LYMPH: Denies easy bruising or bleeding    PHYSICAL EXAM:   Friendly White female, in no acute distress; alert and oriented to person, place and time  VITALS: Ht 5' 1" (1.549 m)   Wt 77.8 kg (171 lb 8.3 oz)   LMP  (LMP Unknown)   BMI 32.41 kg/m²   HENT: Normocephalic, without obvious abnormality.   EYES: Sclerae anicteric.   NECK: No obvious masses.  CARDIOVASCULAR: No peripheral edema.  RESPIRATORY: Normal respiratory effort.  GI: Soft, non-tender, non-distended, obese abdomen.  EXTREMITIES:  No clubbing, cyanosis or edema.  SKIN: Warm and dry. No jaundice. No rashes noted to exposed skin.   NEURO:  Normal gait. No asterixis.  PSYCH:  Memory intact. Thought and speech pattern appropriate. Behavior normal. No depression or anxiety noted.    DIAGNOSTIC STUDIES:    OUTSIDE LABS:                 MRI ABDOMEN W/W/O CONTRAST W/MRCP 8/18/2020:    FINDINGS:    The liver is enlarged.  Hepatic parenchyma demonstrates diffuse signal dropout on out of phase imaging in keeping with steatosis.  There is a 0.7 cm T2 hyperintense lesion within segment 3 that demonstrates homogeneous enhancement on early arterial phase that persist on portal venous and delayed phases, findings that are consistent with a flash filling hemangioma.  No intrahepatic bile duct dilatation.  Portal vasculature is patent.     Gallbladder is surgically absent.  Common bile duct is dilated measuring 1 cm in maximum dimension with tapering at the level of the ampulla.  No intraductal filling defects.     Stomach, duodenum, spleen and adrenal glands are within normal limits.     There has been interval decreased size of the previous pancreatic cystic lesion now measuring 1.3 cm (series 1500, image 67 previously 2.3 cm).  There is a new 1.9 cm rim enhancing T2 " hypointense, T1 isointense lesion at the level of the pancreatic tail, presumably a complex cyst.  No pancreatic ductal dilatation.  Remaining pancreatic parenchyma demonstrates homogeneous enhancement without focal lesions.  No peripancreatic inflammatory changes.     Kidneys are normal in size and location.  Subcentimeter T2 hyperintense nonenhancing cortical lesions noted within both kidneys, too small to accurately characterize but favored to represent cysts.  No hydronephrosis or proximal hydroureter.     The small bowel is normal in caliber.  Visualized colon is unremarkable.  The appendix is normal.     The abdominal aorta tapers normally.  IVC is patent.     No abdominal lymphadenopathy.  No focal mesenteric masses.  No omental or peritoneal implants.     Subcutaneous soft tissues are within normal limits.  No marrow signal abnormality to suggest an infiltrative process.     Impression:     1. Interval decreased size of the previously identified pancreatic cystic lesion at the level of the body/tail which demonstrated characteristics consistent with pseudocyst on EUS dated 10/09/2019.  2. Interval development of a new rim enhancing lesion at the level of the pancreatic tail, favored to represent a complex (proteinaceous versus hemorrhagic) pseudocyst.  A necrotic solid neoplasm is possible but felt to be less likely.  Further characterization with EUS should be considered.  3. Hepatomegaly and hepatic steatosis.  4. Subcentimeter left hepatic lobe enhancing lesion, likely a flash filling hemangioma.    FIBROSCAN 6/22/2021:    Findings  Median liver stiffness score:  6.8  CAP Reading: dB/m:  371     IQR/med %:  7  Interpretation  Fibrosis interpretation is based on medial liver stiffness - Kilopascal (kPa).     Fibrosis Stage:  F 0-1  Steatosis interpretation is based on controlled attenuation parameter - (dB/m).     Steatosis Grade:  S3    FIBROSCAN 6/19/2023:    Findings  Median liver stiffness score:   5.1  CAP Reading: dB/m:  400     IQR/med %:  14  Interpretation  Fibrosis interpretation is based on medial liver stiffness - Kilopascal (kPa).     Fibrosis Stage:  F 0-1  Steatosis interpretation is based on controlled attenuation parameter - (dB/m).     Steatosis Grade:  S3    FIBROSCAN 5/10/2024:       Findings  Median liver stiffness score:  9.6  CAP Reading: dB/m:  376     IQR/med %:  27  Interpretation  Fibrosis interpretation is based on medial liver stiffness - Kilopascal (kPa).     Fibrosis Stage:  F2  Steatosis interpretation is based on controlled attenuation parameter - (dB/m).     Steatosis Grade:  S3    EDUCATION:  Per AVS.    ASSESSMENT & PLAN:  58 y.o. White female with:    1. Hepatic steatosis  Comprehensive Metabolic Panel    CBC Auto Differential    US Abdomen Complete    FibroScan Transplant Hepatology(Vibration Controlled Transient Elastography)      2. Early hepatic fibrosis  Comprehensive Metabolic Panel    CBC Auto Differential    US Abdomen Complete    FibroScan Transplant Hepatology(Vibration Controlled Transient Elastography)      3. Hepatomegaly  Comprehensive Metabolic Panel    CBC Auto Differential    US Abdomen Complete    FibroScan Transplant Hepatology(Vibration Controlled Transient Elastography)      4. Elevated liver enzymes  Comprehensive Metabolic Panel    CBC Auto Differential    US Abdomen Complete    FibroScan Transplant Hepatology(Vibration Controlled Transient Elastography)      5. Class 1 obesity with body mass index (BMI) of 31.0 to 31.9 in adult, unspecified obesity type, unspecified whether serious comorbidity present        6. Type 2 diabetes mellitus without complication, without long-term current use of insulin        7. Hyperlipidemia, unspecified hyperlipidemia type        8. Hypertension, unspecified type        9. Pancreas cyst        10. History of pancreatitis          - Schedule Fibroscan to non-invasively restage liver disease in 6 months.  - If Fibroscan is  again suggestive of stage 2 fibrosis, plan to initiate treatment with Rezdiffra.  - Schedule overdue follow up CT scan ordered by Dr. Cedillo to monitor pancreatic cyst.  - Repeat liver function tests every 6 months.  - Recommend initial weight loss of 15-20 lbs, through diet and exercise.  - Recommend good control of cholesterol, blood pressure, & blood sugar levels.  - Avoid alcohol and herbal supplements/alternative remedies.    Follow up in about 6 months (around 11/10/2024). with labs and Fibroscan prior to visit.    Thank you for allowing me to participate in the care of [facility]       Hepatology Nurse Practitioner  [facility]    CC'ed note to:   No ref. provider found  Patricia Solorzano PA

## 2024-05-10 NOTE — PROCEDURES
FibroScan [place] (Vibration Controlled Transient Elastography)    Date/Time: 5/10/2024 1:45 PM    Performed by: Rosemary Macdonald NP  Authorized by: Rosemary Macdonald NP    Diagnosis:  NAFLD    Probe:  XL    Universal Protocol: Patient's identity, procedure and site were verified, confirmatory pause was performed.  Discussed procedure including risks and potential complications.  Questions answered.  Patient verbalizes understanding and wishes to proceed with VCTE.     Procedure: After providing explanations of the procedure, patient was placed in the supine position with right arm in maximum abduction to allow optimal exposure of right lateral abdomen.  Patient was briefly assessed, Testing was performed in the mid-axillary location, 50Hz Shear Wave pulses were applied and the resulting Shear Wave and Propagation Speed detected with a 3.5 MHz ultrasonic signal, using the FibroScan probe, Skin to liver capsule distance and liver parenchyma were accessed during the entire examination with the FibroScan probe, Patient was instructed to breathe normally and to abstain from sudden movements during the procedure, allowing for random measurements of liver stiffness. At least 10 Shear Waves were produced, Individual measurements of each Shear Wave were calculated.  Patient tolerated the procedure well with no complications.  Meets discharge criteria as was dismissed.  Rates pain 0 out of 10.  Patient will follow up with ordering provider to review results.    Findings  Median liver stiffness score:  9.6  CAP Reading: dB/m:  376    IQR/med %:  27  Interpretation  Fibrosis interpretation is based on medial liver stiffness - Kilopascal (kPa).    Fibrosis Stage:  F2  Steatosis interpretation is based on controlled attenuation parameter - (dB/m).    Steatosis Grade:  S3

## 2024-11-04 ENCOUNTER — LAB VISIT (OUTPATIENT)
Dept: LAB | Facility: HOSPITAL | Age: 59
End: 2024-11-04
Payer: MEDICAID

## 2024-11-04 DIAGNOSIS — R16.0 HEPATOMEGALY: ICD-10-CM

## 2024-11-04 DIAGNOSIS — K74.01 EARLY HEPATIC FIBROSIS: ICD-10-CM

## 2024-11-04 DIAGNOSIS — K76.0 HEPATIC STEATOSIS: ICD-10-CM

## 2024-11-04 DIAGNOSIS — R74.8 ELEVATED LIVER ENZYMES: ICD-10-CM

## 2024-11-04 LAB
ALBUMIN SERPL BCP-MCNC: 3.5 G/DL (ref 3.5–5.2)
ALP SERPL-CCNC: 100 U/L (ref 40–150)
ALT SERPL W/O P-5'-P-CCNC: 49 U/L (ref 10–44)
ANION GAP SERPL CALC-SCNC: 8 MMOL/L (ref 8–16)
AST SERPL-CCNC: 53 U/L (ref 10–40)
BASOPHILS # BLD AUTO: 0.08 K/UL (ref 0–0.2)
BASOPHILS NFR BLD: 0.6 % (ref 0–1.9)
BILIRUB SERPL-MCNC: 0.6 MG/DL (ref 0.1–1)
BUN SERPL-MCNC: 13 MG/DL (ref 6–20)
CALCIUM SERPL-MCNC: 9.2 MG/DL (ref 8.7–10.5)
CHLORIDE SERPL-SCNC: 107 MMOL/L (ref 95–110)
CO2 SERPL-SCNC: 26 MMOL/L (ref 23–29)
CREAT SERPL-MCNC: 0.7 MG/DL (ref 0.5–1.4)
DIFFERENTIAL METHOD BLD: ABNORMAL
EOSINOPHIL # BLD AUTO: 0.3 K/UL (ref 0–0.5)
EOSINOPHIL NFR BLD: 2.3 % (ref 0–8)
ERYTHROCYTE [DISTWIDTH] IN BLOOD BY AUTOMATED COUNT: 14 % (ref 11.5–14.5)
EST. GFR  (NO RACE VARIABLE): >60 ML/MIN/1.73 M^2
GLUCOSE SERPL-MCNC: 111 MG/DL (ref 70–110)
HCT VFR BLD AUTO: 40.6 % (ref 37–48.5)
HGB BLD-MCNC: 12.7 G/DL (ref 12–16)
IMM GRANULOCYTES # BLD AUTO: 0.05 K/UL (ref 0–0.04)
IMM GRANULOCYTES NFR BLD AUTO: 0.4 % (ref 0–0.5)
LYMPHOCYTES # BLD AUTO: 3.3 K/UL (ref 1–4.8)
LYMPHOCYTES NFR BLD: 25.1 % (ref 18–48)
MCH RBC QN AUTO: 28.9 PG (ref 27–31)
MCHC RBC AUTO-ENTMCNC: 31.3 G/DL (ref 32–36)
MCV RBC AUTO: 92 FL (ref 82–98)
MONOCYTES # BLD AUTO: 0.8 K/UL (ref 0.3–1)
MONOCYTES NFR BLD: 5.9 % (ref 4–15)
NEUTROPHILS # BLD AUTO: 8.7 K/UL (ref 1.8–7.7)
NEUTROPHILS NFR BLD: 65.7 % (ref 38–73)
NRBC BLD-RTO: 0 /100 WBC
PLATELET # BLD AUTO: 338 K/UL (ref 150–450)
PMV BLD AUTO: 10.6 FL (ref 9.2–12.9)
POTASSIUM SERPL-SCNC: 4.2 MMOL/L (ref 3.5–5.1)
PROT SERPL-MCNC: 7.9 G/DL (ref 6–8.4)
RBC # BLD AUTO: 4.4 M/UL (ref 4–5.4)
SODIUM SERPL-SCNC: 141 MMOL/L (ref 136–145)
WBC # BLD AUTO: 13.28 K/UL (ref 3.9–12.7)

## 2024-11-04 PROCEDURE — 36415 COLL VENOUS BLD VENIPUNCTURE: CPT | Performed by: NURSE PRACTITIONER

## 2024-11-04 PROCEDURE — 85025 COMPLETE CBC W/AUTO DIFF WBC: CPT | Performed by: NURSE PRACTITIONER

## 2024-11-04 PROCEDURE — 80053 COMPREHEN METABOLIC PANEL: CPT | Performed by: NURSE PRACTITIONER

## 2024-11-12 ENCOUNTER — PROCEDURE VISIT (OUTPATIENT)
Dept: HEPATOLOGY | Facility: CLINIC | Age: 59
End: 2024-11-12
Payer: MEDICAID

## 2024-11-12 ENCOUNTER — OFFICE VISIT (OUTPATIENT)
Dept: HEPATOLOGY | Facility: CLINIC | Age: 59
End: 2024-11-12
Payer: MEDICAID

## 2024-11-12 VITALS — WEIGHT: 168.88 LBS | HEIGHT: 61 IN | BODY MASS INDEX: 31.88 KG/M2

## 2024-11-12 DIAGNOSIS — E11.9 TYPE 2 DIABETES MELLITUS WITHOUT COMPLICATION, WITHOUT LONG-TERM CURRENT USE OF INSULIN: ICD-10-CM

## 2024-11-12 DIAGNOSIS — R16.0 HEPATOMEGALY: ICD-10-CM

## 2024-11-12 DIAGNOSIS — R74.8 ELEVATED LIVER ENZYMES: ICD-10-CM

## 2024-11-12 DIAGNOSIS — K86.2 PANCREAS CYST: Primary | ICD-10-CM

## 2024-11-12 DIAGNOSIS — E78.5 HYPERLIPIDEMIA, UNSPECIFIED HYPERLIPIDEMIA TYPE: ICD-10-CM

## 2024-11-12 DIAGNOSIS — K76.0 HEPATIC STEATOSIS: ICD-10-CM

## 2024-11-12 DIAGNOSIS — K75.81 METABOLIC DYSFUNCTION-ASSOCIATED STEATOHEPATITIS (MASH): ICD-10-CM

## 2024-11-12 DIAGNOSIS — I10 HYPERTENSION, UNSPECIFIED TYPE: ICD-10-CM

## 2024-11-12 DIAGNOSIS — K74.01 EARLY HEPATIC FIBROSIS: ICD-10-CM

## 2024-11-12 DIAGNOSIS — E66.811 CLASS 1 OBESITY WITH BODY MASS INDEX (BMI) OF 31.0 TO 31.9 IN ADULT, UNSPECIFIED OBESITY TYPE, UNSPECIFIED WHETHER SERIOUS COMORBIDITY PRESENT: ICD-10-CM

## 2024-11-12 PROBLEM — M19.049 LOCALIZED, PRIMARY OSTEOARTHRITIS OF HAND: Status: ACTIVE | Noted: 2024-11-12

## 2024-11-12 PROBLEM — K86.9 DISORDER OF PANCREAS: Status: ACTIVE | Noted: 2018-07-03

## 2024-11-12 PROCEDURE — 3008F BODY MASS INDEX DOCD: CPT | Mod: CPTII,,, | Performed by: NURSE PRACTITIONER

## 2024-11-12 PROCEDURE — 1160F RVW MEDS BY RX/DR IN RCRD: CPT | Mod: CPTII,,, | Performed by: NURSE PRACTITIONER

## 2024-11-12 PROCEDURE — 99999 PR PBB SHADOW E&M-EST. PATIENT-LVL III: CPT | Mod: PBBFAC,,, | Performed by: NURSE PRACTITIONER

## 2024-11-12 PROCEDURE — 99213 OFFICE O/P EST LOW 20 MIN: CPT | Mod: PBBFAC | Performed by: NURSE PRACTITIONER

## 2024-11-12 PROCEDURE — 91200 LIVER ELASTOGRAPHY: CPT | Mod: 26,S$PBB,, | Performed by: NURSE PRACTITIONER

## 2024-11-12 PROCEDURE — 99214 OFFICE O/P EST MOD 30 MIN: CPT | Mod: S$PBB,,, | Performed by: NURSE PRACTITIONER

## 2024-11-12 PROCEDURE — 4010F ACE/ARB THERAPY RXD/TAKEN: CPT | Mod: CPTII,,, | Performed by: NURSE PRACTITIONER

## 2024-11-12 PROCEDURE — 1159F MED LIST DOCD IN RCRD: CPT | Mod: CPTII,,, | Performed by: NURSE PRACTITIONER

## 2024-11-12 PROCEDURE — 91200 LIVER ELASTOGRAPHY: CPT | Mod: PBBFAC | Performed by: NURSE PRACTITIONER

## 2024-11-12 RX ORDER — RESMETIROM 80 MG/1
80 TABLET, COATED ORAL DAILY
Qty: 30 TABLET | Refills: 11 | Status: ACTIVE | OUTPATIENT
Start: 2024-11-12

## 2024-11-12 NOTE — PROCEDURES
FibroScan [facility] Hep[facility](Vibration Controlled Transient Elastography)    Date/Time: 11/12/2024 10:15 AM    Performed by: Rosemary Macdonald NP  Authorized by: Rosemary Macdonald NP    Diagnosis:  NAFLD    Probe:  M    Universal Protocol: Patient's identity, procedure and site were verified, confirmatory pause was performed.  Discussed procedure including risks and potential complications.  Questions answered.  Patient verbalizes understanding and wishes to proceed with VCTE.     Procedure: After providing explanations of the procedure, patient was placed in the supine position with right arm in maximum abduction to allow optimal exposure of right lateral abdomen.  Patient was briefly assessed, Testing was performed in the mid-axillary location, 50Hz Shear Wave pulses were applied and the resulting Shear Wave and Propagation Speed detected with a 3.5 MHz ultrasonic signal, using the FibroScan probe, Skin to liver capsule distance and liver parenchyma were accessed during the entire examination with the FibroScan probe, Patient was instructed to breathe normally and to abstain from sudden movements during the procedure, allowing for random measurements of liver stiffness. At least 10 Shear Waves were produced, Individual measurements of each Shear Wave were calculated.  Patient tolerated the procedure well with no complications.  Meets discharge criteria as was dismissed.  Rates pain 0 out of 10.  Patient will follow up with ordering provider to review results.    Findings  Median liver stiffness score:  7.9  CAP Reading: dB/m:  330    IQR/med %:  9  Interpretation  Fibrosis interpretation is based on medial liver stiffness - Kilopascal (kPa).    Fibrosis Stage:  F2  Steatosis interpretation is based on controlled attenuation parameter - (dB/m).    Steatosis Grade:  S3

## 2024-11-12 NOTE — PROGRESS NOTES
[facility] VISIT ESTABLISHED PT NOTE    REFERRING PROVIDER:  No ref. provider found    CHIEF COMPLAINT: Elevated Liver Enzymes/Fatty Liver    HPI: This is a 59 y.o. White female with PMH noted below, presenting for follow up for elevated liver enzymes and fatty liver. She was last seen in clinic by myself in 2024.    The patient's risk factors for NAFLD/ELDRIDGE include:     Obesity                                        Yes; BMI 31.91 - Net weight loss of 3 lbs since last visit. - (history of pancreatitis, so cannot be prescribed GLP-1 RA).  Dyslipidemia                                Yes - Well controlled on low dose Crestor  Insulin resistance/Diabetes         Yes - Last HgbA1c was 6.5% (2024) - previously 6.1% - PCP increased Metformin from 500 mg daily to 1,000 mg daily.  Family history of diabetes            Yes     She has had elevated liver enzymes in a hepatocellular pattern since 2021. Prior imaging has shown hepatomegaly (18.5 cm) and hepatic steatosis. She also has pancreatic cysts, followed by Dr. Maged Cedillo. She had an EUS in 2020, and is well overdue for follow up CT scan.     She has a strong family history of NAFLD/ELDRIDGE. One of her maternal aunts  of liver cancer. She denies any history of heavy alcohol use or illicit drug use. She is immune to Hepatitis A. She is S/P vaccination for Hepatitis B.     Fibroscan to stage liver disease in 2021 was suggestive of significant hepatic steatosis, with F0-F1 fibrosis, and a low likelihood of cirrhosis. She subsequently lost 7 pounds. Follow up Fibroscan in 2023 was suggestive of significant hepatic steatosis, with F0-F1 fibrosis, and a low likelihood of cirrhosis.     Follow up Fibroscan in 2024 was again suggestive of severe fatty infiltration of the liver (S3), but with F2 fibrosis (moderate scarring), and possible progression in fibrosis. Since last visit, she has lost 3 lbs. Her liver enzymes remain mildly elevated. Fibroscan  today is improved, but is still suggestive of severe fatty infiltration of the liver (S3), with F2 fibrosis (moderate scarring).    Given Fibroscan findings, we discussed starting treatment with Rezdiffra. We discussed the MOA and side effects, and she was agreeable to proceed. She is well appearing and has no s/s hepatic decompensation including jaundice, dark urine, pruritus, abdominal distention, hematemesis, melena, or periods of confusion suggestive of hepatic encephalopathy.      Review of patient's allergies indicates:  No Known Allergies    Current Outpatient Medications on File Prior to Visit   Medication Sig Dispense Refill    losartan (COZAAR) 100 MG tablet Take by mouth.      metFORMIN (GLUCOPHAGE) 500 MG tablet Take 1,000 mg by mouth daily with breakfast.      rosuvastatin (CRESTOR) 20 MG tablet Take by mouth.       No current facility-administered medications on file prior to visit.     PMHX:  has a past medical history of Elevated liver enzymes, Fatty liver, Hepatomegaly, Hyperlipidemia, and Hypertension.    PSHX:  has a past surgical history that includes  section (); Endoscopic ultrasound of upper gastrointestinal tract (N/A, 7/3/2018); Esophagogastroduodenoscopy (N/A, 7/3/2018); Endoscopic ultrasound of upper gastrointestinal tract (N/A, 10/9/2019); and Endoscopic ultrasound of upper gastrointestinal tract (N/A, 2020).    FAMILY HISTORY: Negative for liver disease, reviewed in EPIC    SOCIAL HISTORY:   Social History     Tobacco Use   Smoking Status Never   Smokeless Tobacco Never     Social History     Substance and Sexual Activity   Alcohol Use No     Social History     Substance and Sexual Activity   Drug Use No     ROS:   GENERAL: Denies fever, chills, + intentional weight loss, fatigue  HEENT: Denies headaches, dizziness, vision/hearing changes  CARDIOVASCULAR: Denies chest pain, palpitations, or edema  RESPIRATORY: Denies dyspnea, cough  GI: Denies abdominal pain, rectal  "bleeding, nausea, vomiting. No change in bowel pattern or color  : Denies dysuria, hematuria   SKIN: Denies rash, itching   NEURO: Denies confusion, memory loss, or mood changes  PSYCH: Denies depression or anxiety  HEME/LYMPH: Denies easy bruising or bleeding    PHYSICAL EXAM:   Friendly White female, in no acute distress; alert and oriented to person, place and time  VITALS: Ht 5' 1" (1.549 m)   Wt 76.6 kg (168 lb 14 oz)   LMP  (LMP Unknown)   BMI 31.91 kg/m²   HENT: Normocephalic, without obvious abnormality.   EYES: Sclerae anicteric.   NECK: No obvious masses.  CARDIOVASCULAR: No peripheral edema.  RESPIRATORY: Normal respiratory effort.  GI: Soft, non-tender, non-distended, obese abdomen.  EXTREMITIES:  No clubbing, cyanosis or edema.  SKIN: Warm and dry. No jaundice. No rashes noted to exposed skin.   NEURO:  Normal gait. No asterixis.  PSYCH:  Memory intact. Thought and speech pattern appropriate. Behavior normal. No depression or anxiety noted.    DIAGNOSTIC STUDIES:    OUTSIDE LABS:                 MRI ABDOMEN W/W/O CONTRAST W/MRCP 8/18/2020:    FINDINGS:    The liver is enlarged.  Hepatic parenchyma demonstrates diffuse signal dropout on out of phase imaging in keeping with steatosis.  There is a 0.7 cm T2 hyperintense lesion within segment 3 that demonstrates homogeneous enhancement on early arterial phase that persist on portal venous and delayed phases, findings that are consistent with a flash filling hemangioma.  No intrahepatic bile duct dilatation.  Portal vasculature is patent.     Gallbladder is surgically absent.  Common bile duct is dilated measuring 1 cm in maximum dimension with tapering at the level of the ampulla.  No intraductal filling defects.     Stomach, duodenum, spleen and adrenal glands are within normal limits.     There has been interval decreased size of the previous pancreatic cystic lesion now measuring 1.3 cm (series 1500, image 67 previously 2.3 cm).  There is a new 1.9 " cm rim enhancing T2 hypointense, T1 isointense lesion at the level of the pancreatic tail, presumably a complex cyst.  No pancreatic ductal dilatation.  Remaining pancreatic parenchyma demonstrates homogeneous enhancement without focal lesions.  No peripancreatic inflammatory changes.     Kidneys are normal in size and location.  Subcentimeter T2 hyperintense nonenhancing cortical lesions noted within both kidneys, too small to accurately characterize but favored to represent cysts.  No hydronephrosis or proximal hydroureter.     The small bowel is normal in caliber.  Visualized colon is unremarkable.  The appendix is normal.     The abdominal aorta tapers normally.  IVC is patent.     No abdominal lymphadenopathy.  No focal mesenteric masses.  No omental or peritoneal implants.     Subcutaneous soft tissues are within normal limits.  No marrow signal abnormality to suggest an infiltrative process.     Impression:     1. Interval decreased size of the previously identified pancreatic cystic lesion at the level of the body/tail which demonstrated characteristics consistent with pseudocyst on EUS dated 10/09/2019.  2. Interval development of a new rim enhancing lesion at the level of the pancreatic tail, favored to represent a complex (proteinaceous versus hemorrhagic) pseudocyst.  A necrotic solid neoplasm is possible but felt to be less likely.  Further characterization with EUS should be considered.  3. Hepatomegaly and hepatic steatosis.  4. Subcentimeter left hepatic lobe enhancing lesion, likely a flash filling hemangioma.    FIBROSCAN 6/22/2021:    Findings  Median liver stiffness score:  6.8  CAP Reading: dB/m:  371     IQR/med %:  7  Interpretation  Fibrosis interpretation is based on medial liver stiffness - Kilopascal (kPa).     Fibrosis Stage:  F 0-1  Steatosis interpretation is based on controlled attenuation parameter - (dB/m).     Steatosis Grade:  S3    FIBROSCAN 6/19/2023:    Findings  Median liver  stiffness score:  5.1  CAP Reading: dB/m:  400     IQR/med %:  14  Interpretation  Fibrosis interpretation is based on medial liver stiffness - Kilopascal (kPa).     Fibrosis Stage:  F 0-1  Steatosis interpretation is based on controlled attenuation parameter - (dB/m).     Steatosis Grade:  S3    FIBROSCAN 5/10/2024:       Findings  Median liver stiffness score:  9.6  CAP Reading: dB/m:  376     IQR/med %:  27  Interpretation  Fibrosis interpretation is based on medial liver stiffness - Kilopascal (kPa).     Fibrosis Stage:  F2  Steatosis interpretation is based on controlled attenuation parameter - (dB/m).     Steatosis Grade:  S3    FIBROSCAN 11/12/2024:    Findings  Median liver stiffness score:  7.9  CAP Reading: dB/m:  330     IQR/med %:  9  Interpretation  Fibrosis interpretation is based on medial liver stiffness - Kilopascal (kPa).     Fibrosis Stage:  F2  Steatosis interpretation is based on controlled attenuation parameter - (dB/m).     Steatosis Grade:  S3     ASSESSMENT & PLAN:  59 y.o. White female with:    1. Pancreas cyst  CT Abdomen With IV Contrast Routine Oral Contrast      2. Metabolic dysfunction-associated steatohepatitis (MASH)  Comprehensive Metabolic Panel    Lipid Panel    Hemoglobin A1C    TSH    resmetirom (REZDIFFRA) 80 mg Tab    CT Abdomen With IV Contrast Routine Oral Contrast      3. Early hepatic fibrosis  Comprehensive Metabolic Panel    Lipid Panel    Hemoglobin A1C    TSH    resmetirom (REZDIFFRA) 80 mg Tab    CT Abdomen With IV Contrast Routine Oral Contrast      4. Elevated liver enzymes  resmetirom (REZDIFFRA) 80 mg Tab    CT Abdomen With IV Contrast Routine Oral Contrast      5. Hepatomegaly        6. Class 1 obesity with body mass index (BMI) of 31.0 to 31.9 in adult, unspecified obesity type, unspecified whether serious comorbidity present        7. Type 2 diabetes mellitus without complication, without long-term current use of insulin        8. Hyperlipidemia, unspecified  hyperlipidemia type        9. Hypertension, unspecified type          - Fibroscan today to non-invasively restage liver disease.  - Start Rezdiffra 80 mg PO daily for the treatment of MASH with fibrosis.  - Schedule overdue follow up CT scan to monitor pancreatic cyst.  - Repeat liver function tests in 3 months after starting treatment.  - Will also recheck metabolic risk factors with next blood draw.  - Recommend additional weight loss of 15-20 lbs, through diet and exercise.  - Recommend good control of cholesterol, blood pressure, & blood sugar levels.  - Avoid alcohol and herbal supplements/alternative remedies.    Follow up in about 4 months (around 3/12/2025). with labs prior to visit.    Thank you for allowing me to participate in the care of [facility]       Hepatology Nurse Practitioner  [facility]    CC'ed note to:   No ref. provider found  Patricia Solorzano PA-C

## 2024-12-03 ENCOUNTER — HOSPITAL ENCOUNTER (OUTPATIENT)
Dept: RADIOLOGY | Facility: HOSPITAL | Age: 59
Discharge: HOME | End: 2024-12-03
Attending: NURSE PRACTITIONER
Payer: MEDICAID

## 2024-12-03 DIAGNOSIS — K74.01 EARLY HEPATIC FIBROSIS: ICD-10-CM

## 2024-12-03 DIAGNOSIS — K75.81 METABOLIC DYSFUNCTION-ASSOCIATED STEATOHEPATITIS (MASH): ICD-10-CM

## 2024-12-03 DIAGNOSIS — R74.8 ELEVATED LIVER ENZYMES: ICD-10-CM

## 2024-12-03 DIAGNOSIS — K86.2 PANCREAS CYST: ICD-10-CM

## 2024-12-03 PROCEDURE — 74160 CT ABDOMEN W/CONTRAST: CPT | Mod: 26,,, | Performed by: RADIOLOGY

## 2024-12-03 PROCEDURE — 74160 CT ABDOMEN W/CONTRAST: CPT | Mod: TC

## 2024-12-03 PROCEDURE — A9698 NON-RAD CONTRAST MATERIALNOC: HCPCS | Performed by: NURSE PRACTITIONER

## 2024-12-03 PROCEDURE — 25500020 PHARM REV CODE 255: Performed by: NURSE PRACTITIONER

## 2024-12-03 RX ADMIN — BARIUM SULFATE 450 ML: 20 SUSPENSION ORAL at 06:12

## 2024-12-03 RX ADMIN — IOHEXOL 100 ML: 350 INJECTION, SOLUTION INTRAVENOUS at 06:12

## 2024-12-05 ENCOUNTER — PATIENT MESSAGE (OUTPATIENT)
Dept: GASTROENTEROLOGY | Facility: CLINIC | Age: 59
End: 2024-12-05
Payer: MEDICAID

## 2024-12-16 ENCOUNTER — TELEPHONE (OUTPATIENT)
Dept: GASTROENTEROLOGY | Facility: CLINIC | Age: 59
End: 2024-12-16
Payer: MEDICAID

## 2024-12-16 ENCOUNTER — OFFICE VISIT (OUTPATIENT)
Dept: GASTROENTEROLOGY | Facility: CLINIC | Age: 59
End: 2024-12-16
Payer: MEDICAID

## 2024-12-16 VITALS
DIASTOLIC BLOOD PRESSURE: 73 MMHG | SYSTOLIC BLOOD PRESSURE: 115 MMHG | BODY MASS INDEX: 31.8 KG/M2 | HEART RATE: 82 BPM | HEIGHT: 61 IN | WEIGHT: 168.44 LBS

## 2024-12-16 DIAGNOSIS — K86.2 PANCREAS CYST: Primary | ICD-10-CM

## 2024-12-16 DIAGNOSIS — Z87.19 HISTORY OF PANCREATITIS: ICD-10-CM

## 2024-12-16 PROCEDURE — 99214 OFFICE O/P EST MOD 30 MIN: CPT | Mod: S$PBB,,,

## 2024-12-16 PROCEDURE — 99999 PR PBB SHADOW E&M-EST. PATIENT-LVL III: CPT | Mod: PBBFAC,,,

## 2024-12-16 PROCEDURE — 3008F BODY MASS INDEX DOCD: CPT | Mod: CPTII,,,

## 2024-12-16 PROCEDURE — 3074F SYST BP LT 130 MM HG: CPT | Mod: CPTII,,,

## 2024-12-16 PROCEDURE — 99213 OFFICE O/P EST LOW 20 MIN: CPT | Mod: PBBFAC

## 2024-12-16 PROCEDURE — 1159F MED LIST DOCD IN RCRD: CPT | Mod: CPTII,,,

## 2024-12-16 PROCEDURE — 4010F ACE/ARB THERAPY RXD/TAKEN: CPT | Mod: CPTII,,,

## 2024-12-16 PROCEDURE — 3078F DIAST BP <80 MM HG: CPT | Mod: CPTII,,,

## 2024-12-16 NOTE — TELEPHONE ENCOUNTER
----- Message from aLna Rodriguez sent at 12/16/2024  9:49 AM CST -----  Regarding: Appt Access  Contact: [phone]  Patient calling regarding Appointment Access (message) for #Scheduling Request    Date/Time Preference: 12/16/24 at 2:30     Treating Provider: HAY Boggs     Caller Name: Patient     Contact Preference: [phone]     Comments/notes: Patient spoke adyton Brown via portal and was offered appt date and time. Pt wants to confirm if appt is still on because it isn't showing that it was scheduled. Please give pt a call.

## 2024-12-16 NOTE — PROGRESS NOTES
Gastroenterology: [facility]      SUBJECTIVE:         Chief Complaint: Here for evaluation of a pancreatic cyst     History of Present Illness:  Patient is a 59 y.o. female presents with a pancreatic cyst. The cyst was first noted on US 6/9/18 during ED visit for biliary colic which demonstrated cystic lesion of pancreatic tail measuring 2.5cm. Subsequent EUS 7/3/18 showed 24 x 23mm cyst in the pancreatic body with internal debris. FNA with  and amylase 22403. MRI 10/25/2018 demonstrated 2.3cm hyperintense cystic lesion in the tail with smooth peripheral margin   with delayed peripheral enhancement, no internal nodularity, and normal pancreatic ductal caliber. EUS 10/9/19 showed 31mm x 17mm lesion in the pancreatic tail suggestive of a pseudocyst located in the tail. Cytology negative. MRI 8/18/20 demonstrated interval decreased size of the previous pancreatic cystic lesion now measuring 1.3 cm. There is a new 1.9 cm rim enhancing T2 hypointense, T1 isointense lesion at the level of the pancreatic tail, presumably a complex cyst. No pancreatic ductal dilatation. Remaining pancreatic parenchyma demonstrates homogeneous enhancement without focal lesions. No peripancreatic inflammatory changes. Subsequent EUS 8/25/2020 showed  Findings suggestive of a single pancreatic stone were identified in the body of the pancreas   parenchyma and Pancreatic parenchymal abnormalities consisting of hypoechoic shadowing area in the tail were noted in the pancreatic tail likely a sequelae of resolving pancreatic necroma.        No follow up from [id number] d/t insurance issues. Most recent imaging was CT 12/3/24 which demonstrates 2.1 x 1.8 x 2.9 cm septated hypodensity in the pancreatic body increased in size compared to the prior MRI. Additional 1.6 cm rim enhancing hypodensity anterior aspect of the pancreatic tail previously 1.9 cm      Reports having one episode of pancreatitis about 6 years ago. It was  determined to be biliary and she had subsequent cholecystectomy. CT at that time 12/15/18 showed a hypodense lesion in the distal pancreatic body/tail measuring approximately 2.7 cm, recently characterized on MRI of the pelvis. As compared to the previous study, there has been edematous changes of the tail of the pancreas with decreased enhancement as compared to the more proximal pancreas along with peripancreatic inflammatory stranding and free fluid extending along the retroperitoneum and left pericolic gutter compatible with acute pancreatitis.  She is currently asymptomatic. Denies abdominal pain, NV, vomiting, jaundice, weight loss. Denies family history of pancreatic cancer. She does not smoke cigarettes or drink alcohol.         Prior Imaging:  As per HPI      Personal/family factors:    There is not a family history of pancreatic cancer     The patient does not smoke.    ECOG status 0 - Asymptomatic        Review of Systems   Constitutional: no fever, chills or change in weight   Eyes: no visual changes   ENT: no sore throat or dysphagia  Respiratory: no cough or shortness of breath   Cardiovascular: no chest pain or palpitations   Gastrointestinal: as per HPI  Hematologic/Lymphatic: no easy bruising or lymphadenopathy   Musculoskeletal: no arthralgias or myalgias   Neurological: no seizures, tremors or change in mental status  Behavioral/Psych: no auditory or visual hallucinations    Past Medical History:   Diagnosis Date    Elevated liver enzymes     Fatty liver     Hepatomegaly     Hyperlipidemia     Hypertension        Past Surgical History:   Procedure Laterality Date     SECTION      ENDOSCOPIC ULTRASOUND OF UPPER GASTROINTESTINAL TRACT N/A 7/3/2018    Procedure: ULTRASOUND, ENDOSCOPIC, UPPER GI TRACT;  Surgeon: Efren Whitley MD;  Location: [facility];  Service: Endoscopy;  Laterality: N/A;    ENDOSCOPIC ULTRASOUND OF UPPER GASTROINTESTINAL TRACT N/A 10/9/2019    Procedure:  ULTRASOUND, UPPER GI TRACT, ENDOSCOPIC;  Surgeon: Maged Cedillo MD;  [facility]  Service: Endoscopy;  Laterality: N/A;  PM prep    ENDOSCOPIC ULTRASOUND OF UPPER GASTROINTESTINAL TRACT N/A 8/25/2020    Procedure: ULTRASOUND, UPPER GI TRACT, ENDOSCOPIC;  Surgeon: Maged Cedillo MD;  Location: [facility] ENDO;  Service: Endoscopy;  Laterality: N/A;    ESOPHAGOGASTRODUODENOSCOPY N/A 7/3/2018    Procedure: EGD (ESOPHAGOGASTRODUODENOSCOPY);  Surgeon: Efren Whitley MD;  Location: [facility] ENDO;  Service: Endoscopy;  Laterality: N/A;       Family History   Problem Relation Name Age of Onset    Ovarian cancer Maternal Grandmother      Diabetes Mellitus Mother      Kidney disease Mother          S/P Kidney Transplant    Heart disease Mother      Stroke Mother      Heart disease Father      Stroke Father      Liver disease Sister          Fatty Liver Disease    Hypertension Sister      Diabetes Mellitus Brother      Kidney disease Brother      Hypertension Brother      Liver cancer Maternal Aunt      Obesity Maternal Aunt      Hypertension Brother      Liver disease Sister          Fatty Liver Disease    Hypertension Sister      Hypertension Sister      Hypertension Sister      Breast cancer Neg Hx      Cancer Neg Hx      Colon cancer Neg Hx      Esophageal cancer Neg Hx         Social History     Socioeconomic History    Marital status:    Tobacco Use    Smoking status: Never    Smokeless tobacco: Never   Substance and Sexual Activity    Alcohol use: No    Drug use: No    Sexual activity: Never       Current Outpatient Medications on File Prior to Visit   Medication Sig Dispense Refill    losartan (COZAAR) 100 MG tablet Take by mouth.      metFORMIN (GLUCOPHAGE) 500 MG tablet Take 1,000 mg by mouth daily with breakfast.      resmetirom (REZDIFFRA) 80 mg Tab Take 80 mg by mouth once daily. 30 tablet 11    rosuvastatin (CRESTOR) 20 MG tablet Take by mouth.       No current facility-administered  "medications on file prior to visit.       Review of patient's allergies indicates:  No Known Allergies    Physical Exam:  General: Well-developed, well-appearing, no acute distress  Neuro: alert and oriented to person, place, time; normal appearing gait  Eyes: No scleral icterus, pupils equally round, normal-appearing conjunctiva  Neck: supple; no cervical lymphadenopathy  CVS: regular rate and rhythm; no murmurs  Lungs: clear to auscultation bilaterally; no labored breathing, no wheezes  Abdomen: soft, non-distended, non-tender, no rebound tenderness or guarding, nomal bowel sounds  Extremities: no cyanosis, edema, or clubbing  Skin: no rash, no jaundice        Laboratory:   Lab Results   Component Value Date    ALT 49 (H) 11/04/2024    AST 53 (H) 11/04/2024    ALKPHOS 100 11/04/2024    BILITOT 0.6 11/04/2024     Lab Results   Component Value Date    WBC 13.28 (H) 11/04/2024    HGB 12.7 11/04/2024    HCT 40.6 11/04/2024    MCV 92 11/04/2024     11/04/2024     No results found for: "INR", "PROTIME"        Diagnostic/Imaging Results:  As above    ASSESSMENT/PLAN:     Pancreatic cyst in the the body, the tail. Measuring 29 mm and 16mm in size,  increasing  Most likely etiology at this point is a  undetermined    We discussed in detail the natural history of pancreatic cysts, the therapy involved, and the benefits of multimodality surveillance imaging including Ct, MRI, and EUS with FNA    1. Pancreas cyst    2. History of pancreatitis        Plan:      Pancreas cyst    Given 4 year lapse in surveillance and interval growth of cyst since last imaging, I recommend EUS to further characterize cysts and perform FNA if possible  Explained the EUS procedure in detail and answered any questions the patient had regarding the procedure  Went over the risks of EUS including bleeding, perforation, infection, pancreatitis and risks of anesthesia- she verbalized understanding of these risks and would like to proceed with " EUS           Beto Boggs PA-C  Department of Advance Endoscopy  [facility]

## 2024-12-16 NOTE — PROGRESS NOTES
"GENERAL GI PATIENT INTAKE:    COVID symptoms in the last 7 days (runny nose, sore throat, congestion, cough, fever): No  PCP: Michael Singh  If not PCP-  number given to establish [phone]: N/A    ALLERGIES REVIEWED:  Yes    CHIEF COMPLAINT:    Chief Complaint   Patient presents with    Follow-up     Ct scan showed a finding on pancreas       VITAL SIGNS:  /73   Pulse 82   Ht 5' 1" (1.549 m)   Wt 76.4 kg (168 lb 6.9 oz)   LMP  (LMP Unknown)   BMI 31.82 kg/m²      Change in medical, surgical, family or social history: No      REVIEWED MEDICATION LIST RECONCILED INCLUDING ABOVE MEDS:  Yes      "

## 2024-12-17 ENCOUNTER — TELEPHONE (OUTPATIENT)
Dept: ENDOSCOPY | Facility: HOSPITAL | Age: 59
End: 2024-12-17
Payer: MEDICAID

## 2024-12-17 NOTE — TELEPHONE ENCOUNTER
Attempted to call patient to schedule EUS, no answer. Unable to leave message because voicemail is not set up.

## 2024-12-26 ENCOUNTER — PATIENT MESSAGE (OUTPATIENT)
Dept: GASTROENTEROLOGY | Facility: CLINIC | Age: 59
End: 2024-12-26
Payer: MEDICAID

## 2024-12-31 ENCOUNTER — TELEPHONE (OUTPATIENT)
Dept: ENDOSCOPY | Facility: HOSPITAL | Age: 59
End: 2024-12-31
Payer: MEDICAID

## 2024-12-31 DIAGNOSIS — K86.2 PANCREATIC CYST: Primary | ICD-10-CM

## 2024-12-31 NOTE — TELEPHONE ENCOUNTER
Referral for procedure from [facility]      Spoke to patient to schedule procedure(s) Upper Endoscopy Ultrasound (EUS)       Physician to perform procedure(s) Dr. ALPHONSO Cedillo  Date of Procedure (s) 1/21/25  Arrival Time 09:00 AM  Time of Procedure(s) 10:00 AM   Location of Procedure(s) [place] 2nd Floor  Type of Rx Prep sent to patient: N/A  Instructions provided to patient via [facility]    Patient was informed on the following information and verbalized understanding. Screening questionnaire reviewed with patient and complete. If procedure requires anesthesia, a responsible adult needs to be present to accompany the patient home, patient cannot drive after receiving anesthesia. Appointment details are tentative, especially check-in time. Patient will receive a prep-op call 7 days prior to confirm check-in time for procedure. If applicable the patient should contact their pharmacy to verify Rx for procedure prep is ready for pick-up. Patient was advised to call the scheduling department at [phone] if pharmacy states no Rx is available. Patient was advised to call the endoscopy scheduling department if any questions or concerns arise.       Endoscopy Scheduling Department

## 2025-01-19 ENCOUNTER — TELEPHONE (OUTPATIENT)
Dept: ENDOSCOPY | Facility: HOSPITAL | Age: 60
End: 2025-01-19
Payer: MEDICAID

## 2025-01-23 ENCOUNTER — TELEPHONE (OUTPATIENT)
Dept: ENDOSCOPY | Facility: HOSPITAL | Age: 60
End: 2025-01-23
Payer: MEDICAID

## 2025-01-23 NOTE — TELEPHONE ENCOUNTER
Spoke to patient to reschedule procedure(s) Upper Endoscopy Ultrasound (EUS)       Physician to perform procedure(s) Dr. ALPHONSO Cedillo  Date of Procedure (s) 2/10/25  Arrival Time 9:00 AM  Time of Procedure(s) 10:00 AM   Location of Procedure(s) [place] 2nd Floor  Type of Rx Prep sent to patient: Other  Instructions provided to patient via [facility]    Patient was informed on the following information and verbalized understanding. Screening questionnaire reviewed with patient and complete. If procedure requires anesthesia, a responsible adult needs to be present to accompany the patient home, patient cannot drive after receiving anesthesia. Appointment details are tentative, especially check-in time. Patient will receive a prep-op call 7 days prior to confirm check-in time for procedure. If applicable the patient should contact their pharmacy to verify Rx for procedure prep is ready for pick-up. Patient was advised to call the scheduling department at [phone] if pharmacy states no Rx is available. Patient was advised to call the endoscopy scheduling department if any questions or concerns arise.      [facility] Endoscopy Scheduling Department

## 2025-02-10 ENCOUNTER — ANESTHESIA (OUTPATIENT)
Dept: ENDOSCOPY | Facility: HOSPITAL | Age: 60
End: 2025-02-10
Payer: MEDICAID

## 2025-02-10 ENCOUNTER — ANESTHESIA EVENT (OUTPATIENT)
Dept: ENDOSCOPY | Facility: HOSPITAL | Age: 60
End: 2025-02-10
Payer: MEDICAID

## 2025-02-10 ENCOUNTER — HOSPITAL ENCOUNTER (OUTPATIENT)
Facility: HOSPITAL | Age: 60
Discharge: HOME | End: 2025-02-10
Attending: INTERNAL MEDICINE | Admitting: INTERNAL MEDICINE
Payer: MEDICAID

## 2025-02-10 VITALS
HEART RATE: 75 BPM | WEIGHT: 170 LBS | RESPIRATION RATE: 21 BRPM | BODY MASS INDEX: 32.1 KG/M2 | OXYGEN SATURATION: 98 % | SYSTOLIC BLOOD PRESSURE: 110 MMHG | HEIGHT: 61 IN | TEMPERATURE: 98 F | DIASTOLIC BLOOD PRESSURE: 63 MMHG

## 2025-02-10 DIAGNOSIS — K85.00 IDIOPATHIC ACUTE PANCREATITIS WITHOUT INFECTION OR NECROSIS: Primary | ICD-10-CM

## 2025-02-10 LAB — POCT GLUCOSE: 125 MG/DL (ref 70–110)

## 2025-02-10 PROCEDURE — 27202059 HC NEEDLE, FNA (ANY): Performed by: INTERNAL MEDICINE

## 2025-02-10 PROCEDURE — 88173 CYTOPATH EVAL FNA REPORT: CPT | Performed by: STUDENT IN AN ORGANIZED HEALTH CARE EDUCATION/TRAINING PROGRAM

## 2025-02-10 PROCEDURE — 88177 CYTP FNA EVAL EA ADDL: CPT | Performed by: STUDENT IN AN ORGANIZED HEALTH CARE EDUCATION/TRAINING PROGRAM

## 2025-02-10 PROCEDURE — 37000008 HC ANESTHESIA 1ST 15 MINUTES: Performed by: INTERNAL MEDICINE

## 2025-02-10 PROCEDURE — 82150 ASSAY OF AMYLASE: CPT | Performed by: INTERNAL MEDICINE

## 2025-02-10 PROCEDURE — 88172 CYTP DX EVAL FNA 1ST EA SITE: CPT | Performed by: STUDENT IN AN ORGANIZED HEALTH CARE EDUCATION/TRAINING PROGRAM

## 2025-02-10 PROCEDURE — 63600175 PHARM REV CODE 636 W HCPCS: Performed by: NURSE ANESTHETIST, CERTIFIED REGISTERED

## 2025-02-10 PROCEDURE — 37000009 HC ANESTHESIA EA ADD 15 MINS: Performed by: INTERNAL MEDICINE

## 2025-02-10 PROCEDURE — 88305 TISSUE EXAM BY PATHOLOGIST: CPT | Performed by: STUDENT IN AN ORGANIZED HEALTH CARE EDUCATION/TRAINING PROGRAM

## 2025-02-10 PROCEDURE — 43242 EGD US FINE NEEDLE BX/ASPIR: CPT | Mod: ,,, | Performed by: INTERNAL MEDICINE

## 2025-02-10 PROCEDURE — 88342 IMHCHEM/IMCYTCHM 1ST ANTB: CPT | Performed by: STUDENT IN AN ORGANIZED HEALTH CARE EDUCATION/TRAINING PROGRAM

## 2025-02-10 PROCEDURE — 82378 CARCINOEMBRYONIC ANTIGEN: CPT | Performed by: INTERNAL MEDICINE

## 2025-02-10 PROCEDURE — 43242 EGD US FINE NEEDLE BX/ASPIR: CPT | Performed by: INTERNAL MEDICINE

## 2025-02-10 RX ORDER — PROPOFOL 10 MG/ML
VIAL (ML) INTRAVENOUS
Status: DISCONTINUED | OUTPATIENT
Start: 2025-02-10 | End: 2025-02-10

## 2025-02-10 RX ORDER — PROPOFOL 10 MG/ML
VIAL (ML) INTRAVENOUS CONTINUOUS PRN
Status: DISCONTINUED | OUTPATIENT
Start: 2025-02-10 | End: 2025-02-10

## 2025-02-10 RX ORDER — LIDOCAINE HYDROCHLORIDE 20 MG/ML
INJECTION INTRAVENOUS
Status: DISCONTINUED | OUTPATIENT
Start: 2025-02-10 | End: 2025-02-10

## 2025-02-10 RX ADMIN — GLYCOPYRROLATE 0.2 MG: 0.2 INJECTION, SOLUTION INTRAMUSCULAR; INTRAVITREAL at 09:02

## 2025-02-10 RX ADMIN — PROPOFOL 150 MCG/KG/MIN: 10 INJECTION, EMULSION INTRAVENOUS at 09:02

## 2025-02-10 RX ADMIN — PROPOFOL 70 MG: 10 INJECTION, EMULSION INTRAVENOUS at 09:02

## 2025-02-10 RX ADMIN — LIDOCAINE HYDROCHLORIDE 100 MG: 20 INJECTION, SOLUTION INTRAVENOUS at 09:02

## 2025-02-10 NOTE — ANESTHESIA POSTPROCEDURE EVALUATION
Anesthesia Post Evaluation    Patient: Savannah Guerrero    Procedure(s) Performed: Procedure(s) (LRB):  ULTRASOUND, UPPER GI TRACT, ENDOSCOPIC (N/A)    Final Anesthesia Type: general      Patient location during evaluation: GI PACU  Patient participation: Yes- Able to Participate  Level of consciousness: awake and alert, oriented and awake  Post-procedure vital signs: reviewed and stable  Pain management: adequate  Airway patency: patent    PONV status at discharge: No PONV  Anesthetic complications: no      Cardiovascular status: stable  Respiratory status: unassisted and room air  Hydration status: euvolemic  Follow-up not needed.              Vitals Value Taken Time   /63 02/10/25 1001   Temp 36.6 °C (97.9 °F) 02/10/25 0941   Pulse 75 02/10/25 1001   Resp 21 02/10/25 1001   SpO2 98 % 02/10/25 1001         Event Time   Out of Recovery 10:01:00         Pain/Rohan Score: Rohan Score: 10 (2/10/2025 10:01 AM)           show

## 2025-02-10 NOTE — H&P
Short Stay Endoscopy History and Physical    PCP - Michael Singh MD  Referring Physician - Milly Colmenares LPN  No address on file    Procedure - eus  ASA - per anesthesia  Mallampati - per anesthesia  History of Anesthesia problems - no  Family history Anesthesia problems -  no   Plan of anesthesia - General    HPI:  This is a 59 y.o. female here for evaluation of: pancreas cyst    Reflux - no  Dysphagia - no  Abdominal pain - no  Diarrhea - no    ROS:  Constitutional: No fevers, chills, No weight loss  CV: No chest pain  Pulm: No cough, No shortness of breath  Ophtho: No vision changes  GI: see HPI  Derm: No rash    Medical History:  has a past medical history of Elevated liver enzymes, Fatty liver, Hepatomegaly, Hyperlipidemia, and Hypertension.    Surgical History:  has a past surgical history that includes  section (); Endoscopic ultrasound of upper gastrointestinal tract (N/A, 2018); Esophagogastroduodenoscopy (N/A, 2018); Endoscopic ultrasound of upper gastrointestinal tract (N/A, 10/09/2019); Endoscopic ultrasound of upper gastrointestinal tract (N/A, 2020); and Colonoscopy (2024).    Family History: family history includes Diabetes Mellitus in her brother and mother; Heart disease in her father and mother; Hypertension in her brother, brother, sister, sister, sister, and sister; Kidney disease in her brother and mother; Liver cancer in her maternal aunt; Liver disease in her sister and sister; Obesity in her maternal aunt; Ovarian cancer in her maternal grandmother; Stroke in her father and mother..    Social History:  reports that she has never smoked. She has never used smokeless tobacco. She reports that she does not drink alcohol and does not use drugs.    Review of patient's allergies indicates:  No Known Allergies    Medications:   Medications Prior to Admission   Medication Sig Dispense Refill Last Dose/Taking    losartan (COZAAR) 100 MG tablet Take  by mouth.   2/9/2025 Bedtime    metFORMIN (GLUCOPHAGE) 500 MG tablet Take 1,000 mg by mouth daily with breakfast.   2/9/2025 Bedtime    resmetirom (REZDIFFRA) 80 mg Tab Take 80 mg by mouth once daily. 30 tablet 11 2/9/2025 Bedtime    rosuvastatin (CRESTOR) 20 MG tablet Take by mouth.   2/9/2025 Bedtime       Physical Exam:    Vital Signs:   Vitals:    02/10/25 0906   BP: 114/69   Pulse: 68   Resp: 18   Temp: 97.9 °F (36.6 °C)       General Appearance: Well appearing in no acute distress    Labs:  Lab Results   Component Value Date    WBC 13.28 (H) 11/04/2024    HGB 12.7 11/04/2024    HCT 40.6 11/04/2024     11/04/2024    ALT 49 (H) 11/04/2024    AST 53 (H) 11/04/2024     11/04/2024    K 4.2 11/04/2024     11/04/2024    CREATININE 0.7 11/04/2024    BUN 13 11/04/2024    CO2 26 11/04/2024       I have explained the risks and benefits of this endoscopic procedure to the patient including but not limited to bleeding, inflammation, infection, perforation, and death.      Maged Cedillo MD

## 2025-02-10 NOTE — ANESTHESIA PREPROCEDURE EVALUATION
02/10/2025  Savananh Guerrero is a 59 y.o., female here for an EUS to evaluate pancreatic cysts.      Pre-op Assessment    I have reviewed the Patient Summary Reports.    I have reviewed the NPO Status.   I have reviewed the Medications.     Review of Systems  Anesthesia Hx:  No problems with previous Anesthesia               Denies Personal Hx of Anesthesia complications.                    Social:  No Alcohol Use, Non-Smoker       Cardiovascular:     Hypertension                                          Pulmonary:        Sleep Apnea                Hepatic/GI:     GERD Liver Disease, Hepatitis              Musculoskeletal:  Arthritis               Endocrine:  Diabetes         Obesity / BMI > 30      Physical Exam  General: Well nourished, Cooperative, Alert and Oriented    Airway:  Mallampati: II   Mouth Opening: Normal  TM Distance: Normal  Tongue: Normal  Neck ROM: Normal ROM    Chest/Lungs:  Clear to auscultation, Normal Respiratory Rate    Heart:  Rate: Normal  Rhythm: Regular Rhythm        Anesthesia Plan  Type of Anesthesia, risks & benefits discussed:    Anesthesia Type: Gen Natural Airway  Intra-op Monitoring Plan: Standard ASA Monitors  Post Op Pain Control Plan: multimodal analgesia  Induction:  IV  Informed Consent: Informed consent signed with the Patient and all parties understand the risks and agree with anesthesia plan.  All questions answered.   ASA Score: 2    Ready For Surgery From Anesthesia Perspective.     .

## 2025-02-10 NOTE — [employer] PATIENT INSTRUCTIONS
Discharge Summary/Instructions after an Endoscopic Procedure  Patient Name: Savannah Guerrero  Patient MRN: [id number]  Patient YOB: 1965  Monday, February 10, 2025  Maged Cedillo MD  Dear patient,  As a result of recent federal legislation (The Federal Cures Act), you may   receive lab or pathology results from your procedure in your [facility]   account before your physician is able to contact you. Your physician or   their representative will relay the results to you with their   recommendations at their soonest availability.  Thank you,  Your health is very important to us during the Covid Crisis. Following your   procedure today, you will receive a daily text for 2 weeks asking about   signs or symptoms of Covid 19.  Please respond to this text when you   receive it so we can follow up and keep you as safe as possible.   RESTRICTIONS:  During your procedure today, you received medications for sedation.  These   medications may affect your judgment, balance and coordination.  Therefore,   for 24 hours, you have the following restrictions:   - DO NOT drive a car, operate machinery, make legal/financial decisions,   sign important papers or drink alcohol.    ACTIVITY:  Today: no heavy lifting, straining or running due to procedural   sedation/anesthesia.  The following day: return to full activity including work.  DIET:  Eat and drink normally unless instructed otherwise.     TREATMENT FOR COMMON SIDE EFFECTS:  - Mild abdominal pain, nausea, belching, bloating or excessive gas:  rest,   eat lightly and use a heating pad.  - Sore Throat: treat with throat lozenges and/or gargle with warm salt   water.  - Because air was used during the procedure, expelling large amounts of air   from your rectum or belching is normal.  - If a bowel prep was taken, you may not have a bowel movement for 1-3 days.    This is normal.  SYMPTOMS TO WATCH FOR AND REPORT TO YOUR PHYSICIAN:  1. Abdominal pain or bloating, other  than gas cramps.  2. Chest pain.  3. Back pain.  4. Signs of infection such as: chills or fever occurring within 24 hours   after the procedure.  5. Rectal bleeding, which would show as bright red, maroon, or black stools.   (A tablespoon of blood from the rectum is not serious, especially if   hemorrhoids are present.)  6. Vomiting.  7. Weakness or dizziness.  GO DIRECTLY TO THE NEAREST EMERGENCY ROOM IF YOU HAVE ANY OF THE FOLLOWING:      Difficulty breathing              Chills and/or fever over 101 F   Persistent vomiting and/or vomiting blood   Severe abdominal pain   Severe chest pain   Black, tarry stools   Bleeding- more than one tablespoon   Any other symptom or condition that you feel may need urgent attention  Your doctor recommends these additional instructions:  If any biopsies were taken, your doctors clinic will contact you in 1 to 2   weeks with any results.  - Discharge patient to home.   - Resume previous diet.   - Continue present medications.   - Await cytology results.   - Return to primary care physician at appointment to be scheduled.  For questions, problems or results please call your physician - Maged Cedillo MD.  EMERGENCY PHONE NUMBER: [phone],  LAB RESULTS: [phone]  IF A COMPLICATION OR EMERGENCY SITUATION ARISES AND YOU ARE UNABLE TO REACH   YOUR PHYSICIAN - GO DIRECTLY TO THE EMERGENCY ROOM.  Maged Cedillo MD  2/10/2025 9:39:55 AM  This report has been verified and signed electronically.  Dear patient,  As a result of recent federal legislation (The Federal Cures Act), you may   receive lab or pathology results from your procedure in your [facility]   account before your physician is able to contact you. Your physician or   their representative will relay the results to you with their   recommendations at their soonest availability.  Thank you,  [employer]

## 2025-02-10 NOTE — TRANSFER OF CARE
"Anesthesia Transfer of Care Note    Patient: Savannah Guerrero    Procedure(s) Performed: Procedure(s) (LRB):  ULTRASOUND, UPPER GI TRACT, ENDOSCOPIC (N/A)    Patient location: GI    Anesthesia Type: general    Transport from OR: Transported from OR on room air with adequate spontaneous ventilation    Post pain: adequate analgesia    Post assessment: no apparent anesthetic complications and tolerated procedure well    Post vital signs: stable    Level of consciousness: awake, alert and oriented    Nausea/Vomiting: no nausea/vomiting    Complications: none    Transfer of care protocol was followed      Last vitals: Visit Vitals  /69 (BP Location: Left arm, Patient Position: Lying)   Pulse 68   Temp 36.6 °C (97.9 °F) (Skin)   Resp 18   Ht 5' 1" (1.549 m)   Wt 77.1 kg (170 lb)   LMP  (LMP Unknown)   SpO2 98%   Breastfeeding No   BMI 32.12 kg/m²     "

## 2025-02-11 LAB
AMYLASE, PANCREATIC FLUID: NORMAL U/L
BDY SITE: NORMAL
BDY SITE: NORMAL
CEA FLD-MCNC: 83 NG/ML

## 2025-02-14 LAB
COMMENT: NORMAL
FINAL PATHOLOGIC DIAGNOSIS: NORMAL
MICROSCOPIC EXAM: NORMAL

## 2025-03-03 ENCOUNTER — LAB VISIT (OUTPATIENT)
Dept: LAB | Facility: HOSPITAL | Age: 60
End: 2025-03-03
Attending: NURSE PRACTITIONER
Payer: MEDICAID

## 2025-03-03 DIAGNOSIS — K75.81 METABOLIC DYSFUNCTION-ASSOCIATED STEATOHEPATITIS (MASH): ICD-10-CM

## 2025-03-03 DIAGNOSIS — K74.01 EARLY HEPATIC FIBROSIS: ICD-10-CM

## 2025-03-03 LAB
ALBUMIN SERPL BCP-MCNC: 3.3 G/DL (ref 3.5–5.2)
ALP SERPL-CCNC: 82 U/L (ref 40–150)
ALT SERPL W/O P-5'-P-CCNC: 63 U/L (ref 10–44)
ANION GAP SERPL CALC-SCNC: 7 MMOL/L (ref 8–16)
AST SERPL-CCNC: 47 U/L (ref 10–40)
BILIRUB SERPL-MCNC: 0.5 MG/DL (ref 0.1–1)
BUN SERPL-MCNC: 12 MG/DL (ref 6–20)
CALCIUM SERPL-MCNC: 8.8 MG/DL (ref 8.7–10.5)
CHLORIDE SERPL-SCNC: 108 MMOL/L (ref 95–110)
CHOLEST SERPL-MCNC: 130 MG/DL (ref 120–199)
CHOLEST/HDLC SERPL: 2.5 {RATIO} (ref 2–5)
CO2 SERPL-SCNC: 24 MMOL/L (ref 23–29)
CREAT SERPL-MCNC: 0.6 MG/DL (ref 0.5–1.4)
EST. GFR  (NO RACE VARIABLE): >60 ML/MIN/1.73 M^2
ESTIMATED AVG GLUCOSE: 140 MG/DL (ref 68–131)
GLUCOSE SERPL-MCNC: 111 MG/DL (ref 70–110)
HBA1C MFR BLD: 6.5 % (ref 4–5.6)
HDLC SERPL-MCNC: 53 MG/DL (ref 40–75)
HDLC SERPL: 40.8 % (ref 20–50)
LDLC SERPL CALC-MCNC: 52.4 MG/DL (ref 63–159)
NONHDLC SERPL-MCNC: 77 MG/DL
POTASSIUM SERPL-SCNC: 4.2 MMOL/L (ref 3.5–5.1)
PROT SERPL-MCNC: 7.2 G/DL (ref 6–8.4)
SODIUM SERPL-SCNC: 139 MMOL/L (ref 136–145)
TRIGL SERPL-MCNC: 123 MG/DL (ref 30–150)
TSH SERPL DL<=0.005 MIU/L-ACNC: 1.74 UIU/ML (ref 0.4–4)

## 2025-03-03 PROCEDURE — 36415 COLL VENOUS BLD VENIPUNCTURE: CPT | Performed by: NURSE PRACTITIONER

## 2025-03-03 PROCEDURE — 84443 ASSAY THYROID STIM HORMONE: CPT | Performed by: NURSE PRACTITIONER

## 2025-03-03 PROCEDURE — 80053 COMPREHEN METABOLIC PANEL: CPT | Performed by: NURSE PRACTITIONER

## 2025-03-03 PROCEDURE — 80061 LIPID PANEL: CPT | Performed by: NURSE PRACTITIONER

## 2025-03-03 PROCEDURE — 83036 HEMOGLOBIN GLYCOSYLATED A1C: CPT | Performed by: NURSE PRACTITIONER

## 2025-03-04 ENCOUNTER — RESULTS FOLLOW-UP (OUTPATIENT)
Dept: HEPATOLOGY | Facility: CLINIC | Age: 60
End: 2025-03-04

## 2025-03-10 DIAGNOSIS — Z12.31 ENCOUNTER FOR SCREENING MAMMOGRAM FOR MALIGNANT NEOPLASM OF BREAST: Primary | ICD-10-CM

## 2025-03-11 ENCOUNTER — PATIENT MESSAGE (OUTPATIENT)
Dept: HEPATOLOGY | Facility: CLINIC | Age: 60
End: 2025-03-11
Payer: MEDICAID

## 2025-04-22 ENCOUNTER — OFFICE VISIT (OUTPATIENT)
Dept: HEPATOLOGY | Facility: CLINIC | Age: 60
End: 2025-04-22
Payer: MEDICAID

## 2025-04-22 VITALS — BODY MASS INDEX: 32.22 KG/M2 | WEIGHT: 170.63 LBS | HEIGHT: 61 IN

## 2025-04-22 DIAGNOSIS — R74.8 ELEVATED LIVER ENZYMES: ICD-10-CM

## 2025-04-22 DIAGNOSIS — K74.01 EARLY HEPATIC FIBROSIS: ICD-10-CM

## 2025-04-22 DIAGNOSIS — K86.2 PANCREATIC CYST: ICD-10-CM

## 2025-04-22 DIAGNOSIS — E11.9 TYPE 2 DIABETES MELLITUS WITHOUT COMPLICATION, WITHOUT LONG-TERM CURRENT USE OF INSULIN: ICD-10-CM

## 2025-04-22 DIAGNOSIS — K75.81 METABOLIC DYSFUNCTION-ASSOCIATED STEATOHEPATITIS (MASH): Primary | ICD-10-CM

## 2025-04-22 DIAGNOSIS — E78.5 HYPERLIPIDEMIA, UNSPECIFIED HYPERLIPIDEMIA TYPE: ICD-10-CM

## 2025-04-22 DIAGNOSIS — R16.0 HEPATOMEGALY: ICD-10-CM

## 2025-04-22 DIAGNOSIS — E66.811 CLASS 1 OBESITY WITH BODY MASS INDEX (BMI) OF 32.0 TO 32.9 IN ADULT, UNSPECIFIED OBESITY TYPE, UNSPECIFIED WHETHER SERIOUS COMORBIDITY PRESENT: ICD-10-CM

## 2025-04-22 PROCEDURE — 99214 OFFICE O/P EST MOD 30 MIN: CPT | Mod: S$PBB,,, | Performed by: NURSE PRACTITIONER

## 2025-04-22 PROCEDURE — 1160F RVW MEDS BY RX/DR IN RCRD: CPT | Mod: CPTII,,, | Performed by: NURSE PRACTITIONER

## 2025-04-22 PROCEDURE — 99999 PR PBB SHADOW E&M-EST. PATIENT-LVL III: CPT | Mod: PBBFAC,,, | Performed by: NURSE PRACTITIONER

## 2025-04-22 PROCEDURE — 99213 OFFICE O/P EST LOW 20 MIN: CPT | Mod: PBBFAC | Performed by: NURSE PRACTITIONER

## 2025-04-22 PROCEDURE — 1159F MED LIST DOCD IN RCRD: CPT | Mod: CPTII,,, | Performed by: NURSE PRACTITIONER

## 2025-04-22 PROCEDURE — 3008F BODY MASS INDEX DOCD: CPT | Mod: CPTII,,, | Performed by: NURSE PRACTITIONER

## 2025-04-22 PROCEDURE — 3044F HG A1C LEVEL LT 7.0%: CPT | Mod: CPTII,,, | Performed by: NURSE PRACTITIONER

## 2025-04-22 PROCEDURE — 4010F ACE/ARB THERAPY RXD/TAKEN: CPT | Mod: CPTII,,, | Performed by: NURSE PRACTITIONER

## 2025-04-22 NOTE — PROGRESS NOTES
[facility] VISIT ESTABLISHED PT NOTE    REFERRING PROVIDER:  No ref. provider found    CHIEF COMPLAINT: MASH    HPI: This is a 59 y.o. White female with PMH noted below, presenting for follow up for MAS. She was last seen in clinic by myself in 2024.    The patient's risk factors for MASLD/MASH include:     Obesity                                        Yes; BMI 32.24 - Net weight gain of 2 lbs since last visit. - (history of pancreatitis, so cannot be prescribed GLP-1 RA).  Dyslipidemia                                Yes - Well controlled on low dose Crestor   Latest Reference Range & Units 25 08:30   Cholesterol Total 120 - 199 mg/dL 130   HDL 40 - 75 mg/dL 53   HDL/Cholesterol Ratio 20.0 - 50.0 % 40.8   Non-HDL Cholesterol mg/dL 77   Total Cholesterol/HDL Ratio 2.0 - 5.0  2.5   Triglycerides 30 - 150 mg/dL 123   LDL Cholesterol 63.0 - 159.0 mg/dL 52.4 (L)     Insulin resistance/Diabetes         Yes - Last HgbA1c was 6.5% (3/2025) On Metformin 1,000 mg daily.  Family history of diabetes            Yes     She has had elevated liver enzymes in a hepatocellular pattern since 2021. Prior imaging has shown hepatomegaly (18.5 cm) and hepatic steatosis. She also has pancreatic cysts, followed by Dr. Maged Cedillo. She had an EUS in 2020, and is well overdue for follow up CT scan.     She has a strong family history of MASLD/MASH. One of her maternal aunts  of liver cancer. She denies any history of heavy alcohol use or illicit drug use. She is immune to Hepatitis A. She is S/P vaccination for Hepatitis B.     Fibroscan to stage liver disease in 2021 was suggestive of significant hepatic steatosis, with F0-F1 fibrosis, and a low likelihood of cirrhosis. She subsequently lost 7 pounds. Follow up Fibroscan in 2023 was suggestive of significant hepatic steatosis, with F0-F1 fibrosis, and a low likelihood of cirrhosis.     Follow up Fibroscan in 2024 was again suggestive of severe fatty  infiltration of the liver (S3), but with F2 fibrosis (moderate scarring), and possible progression in fibrosis. She subsequently lost 3 lbs. Her liver enzymes remain mildly elevated. Fibroscan in 11/2024 was improved, but was still suggestive of severe fatty infiltration of the liver (S3), with F2 fibrosis (moderate scarring).    Most recent abdominal CT to monitor pancreatic cyst in 12/2024 showed:    CT Abdomen With IV Contrast Routine Oral Contrast  Narrative: EXAMINATION:  CT ABDOMEN WITH IV CONTRAST    CLINICAL HISTORY:  Pancreatic cyst;  Nonalcoholic steatohepatitis (ELDRIDGE)    TECHNIQUE:  CT of the abdomen was performed following the intravenous administration of 100 cc Omnipaque 350 and the oral administration of 450 cc Readi-Cat.  Axial sagittal and coronal reformatted images submitted.    COMPARISON:  MRI August 2020    FINDINGS:  In the chest, no significant pleural or pericardial fluid.  Some air in the distal esophagus either reflux or dysmotility.  Some volume loss at the lung bases.  No confluent consolidation.    In the abdomen, liver is significantly enlarged 22.7 cm.  7 mm enhancing nodule left lobe of the liver likely a flash filling hemangioma similar to MRI.  Gallbladder is been removed.  No biliary dilatation.  2.1 x 1.8 x 2.9 cm septated hypodensity in the pancreatic body increased in size compared to the prior MRI.  Additional 1.6 cm rim enhancing hypodensity anterior aspect of the pancreatic tail previously 1.9 cm.  Spleen not enlarged.  No adrenal mass.  Presumed parapelvic cyst left kidney.  No enhancing renal masses.  No hydronephrosis.    Aorta tapers normally.  No convincing para-aortic adenopathy.  Visualized loops of bowel are not dilated.  Portal vein, splenic vein, and SMV are patent.    Small fat containing umbilical hernia.  Degenerative change in the spine.  No convincing lytic nor blastic lesion.  Impression: Increased size of the septated hypodensity posterior aspect of the  pancreatic body with measurements as above.  Additional 1.6 cm rim enhancing hypodensity anterior aspect of the pancreatic tail slightly decreased in size.  Further evaluation with MRI/EUS may be helpful.    Significant hepatomegaly.    Additional findings above.    This report was flagged in Epic as abnormal.    Electronically signed by: nAoop Giraldo MD  Date:    2024  Time:    11:29    She underwent EUS with Dr. Cedillo in 2025. EUS showed a cystic lesion was seen in the pancreatic body. FNA was performed, and showed no malignant cells.    Given prior Fibroscan findings, she was placed on treatment with Rezdiffra 80 mg daily. She denies any significant AE. 3 month follow up labs showed that her liver enzymes are stable/improved.     She is well appearing and has no s/s hepatic decompensation including jaundice, dark urine, pruritus, abdominal distention, hematemesis, melena, or periods of confusion suggestive of hepatic encephalopathy.      Review of patient's allergies indicates:  No Known Allergies    Current Outpatient Medications on File Prior to Visit   Medication Sig Dispense Refill    losartan (COZAAR) 100 MG tablet Take by mouth.      metFORMIN (GLUCOPHAGE) 500 MG tablet Take 1,000 mg by mouth daily with breakfast.      resmetirom (REZDIFFRA) 80 mg Tab Take 80 mg by mouth once daily. 30 tablet 11    rosuvastatin (CRESTOR) 20 MG tablet Take by mouth.       No current facility-administered medications on file prior to visit.     PMHX:  has a past medical history of Elevated liver enzymes, Fatty liver, Hepatomegaly, Hyperlipidemia, and Hypertension.    PSHX:  has a past surgical history that includes  section (); Endoscopic ultrasound of upper gastrointestinal tract (N/A, 2018); Esophagogastroduodenoscopy (N/A, 2018); Endoscopic ultrasound of upper gastrointestinal tract (N/A, 10/09/2019); Endoscopic ultrasound of upper gastrointestinal tract (N/A, 2020); Colonoscopy  "(09/2024); and Endoscopic ultrasound of upper gastrointestinal tract (N/A, 2/10/2025).    FAMILY HISTORY: Negative for liver disease, reviewed in EPIC    SOCIAL HISTORY:   Social History     Tobacco Use   Smoking Status Never   Smokeless Tobacco Never     Social History     Substance and Sexual Activity   Alcohol Use No     Social History     Substance and Sexual Activity   Drug Use No     ROS:   GENERAL: Denies fever, chills, + weight gain, fatigue  HEENT: Denies headaches, dizziness, vision/hearing changes  CARDIOVASCULAR: Denies chest pain, palpitations, or edema  RESPIRATORY: Denies dyspnea, cough  GI: Denies abdominal pain, rectal bleeding, nausea, vomiting. No change in bowel pattern or color  : Denies dysuria, hematuria   SKIN: Denies rash, itching   NEURO: Denies confusion, memory loss, or mood changes  PSYCH: Denies depression or anxiety  HEME/LYMPH: Denies easy bruising or bleeding    PHYSICAL EXAM:   Friendly White female, in no acute distress; alert and oriented to person, place and time  VITALS: Ht 5' 1" (1.549 m)   Wt 77.4 kg (170 lb 10.2 oz)   LMP  (LMP Unknown)   BMI 32.24 kg/m²   HENT: Normocephalic, without obvious abnormality.   EYES: Sclerae anicteric.   NECK: No obvious masses.  CARDIOVASCULAR: No peripheral edema.  RESPIRATORY: Normal respiratory effort.  GI: Soft, non-tender, non-distended, obese abdomen.  EXTREMITIES:  No clubbing, cyanosis or edema.  SKIN: Warm and dry. No jaundice. No rashes noted to exposed skin.   NEURO:  Normal gait. No asterixis.  PSYCH:  Memory intact. Thought and speech pattern appropriate. Behavior normal. No depression or anxiety noted.    DIAGNOSTIC STUDIES:    OUTSIDE LABS:                 MRI ABDOMEN W/W/O CONTRAST W/MRCP 8/18/2020:    FINDINGS:    The liver is enlarged.  Hepatic parenchyma demonstrates diffuse signal dropout on out of phase imaging in keeping with steatosis.  There is a 0.7 cm T2 hyperintense lesion within segment 3 that demonstrates " homogeneous enhancement on early arterial phase that persist on portal venous and delayed phases, findings that are consistent with a flash filling hemangioma.  No intrahepatic bile duct dilatation.  Portal vasculature is patent.     Gallbladder is surgically absent.  Common bile duct is dilated measuring 1 cm in maximum dimension with tapering at the level of the ampulla.  No intraductal filling defects.     Stomach, duodenum, spleen and adrenal glands are within normal limits.     There has been interval decreased size of the previous pancreatic cystic lesion now measuring 1.3 cm (series 1500, image 67 previously 2.3 cm).  There is a new 1.9 cm rim enhancing T2 hypointense, T1 isointense lesion at the level of the pancreatic tail, presumably a complex cyst.  No pancreatic ductal dilatation.  Remaining pancreatic parenchyma demonstrates homogeneous enhancement without focal lesions.  No peripancreatic inflammatory changes.     Kidneys are normal in size and location.  Subcentimeter T2 hyperintense nonenhancing cortical lesions noted within both kidneys, too small to accurately characterize but favored to represent cysts.  No hydronephrosis or proximal hydroureter.     The small bowel is normal in caliber.  Visualized colon is unremarkable.  The appendix is normal.     The abdominal aorta tapers normally.  IVC is patent.     No abdominal lymphadenopathy.  No focal mesenteric masses.  No omental or peritoneal implants.     Subcutaneous soft tissues are within normal limits.  No marrow signal abnormality to suggest an infiltrative process.     Impression:     1. Interval decreased size of the previously identified pancreatic cystic lesion at the level of the body/tail which demonstrated characteristics consistent with pseudocyst on EUS dated 10/09/2019.  2. Interval development of a new rim enhancing lesion at the level of the pancreatic tail, favored to represent a complex (proteinaceous versus hemorrhagic)  pseudocyst.  A necrotic solid neoplasm is possible but felt to be less likely.  Further characterization with EUS should be considered.  3. Hepatomegaly and hepatic steatosis.  4. Subcentimeter left hepatic lobe enhancing lesion, likely a flash filling hemangioma.    FIBROSCAN 6/22/2021:    Findings  Median liver stiffness score:  6.8  CAP Reading: dB/m:  371     IQR/med %:  7  Interpretation  Fibrosis interpretation is based on medial liver stiffness - Kilopascal (kPa).     Fibrosis Stage:  F 0-1  Steatosis interpretation is based on controlled attenuation parameter - (dB/m).     Steatosis Grade:  S3    FIBROSCAN 6/19/2023:    Findings  Median liver stiffness score:  5.1  CAP Reading: dB/m:  400     IQR/med %:  14  Interpretation  Fibrosis interpretation is based on medial liver stiffness - Kilopascal (kPa).     Fibrosis Stage:  F 0-1  Steatosis interpretation is based on controlled attenuation parameter - (dB/m).     Steatosis Grade:  S3    FIBROSCAN 5/10/2024:       Findings  Median liver stiffness score:  9.6  CAP Reading: dB/m:  376     IQR/med %:  27  Interpretation  Fibrosis interpretation is based on medial liver stiffness - Kilopascal (kPa).     Fibrosis Stage:  F2  Steatosis interpretation is based on controlled attenuation parameter - (dB/m).     Steatosis Grade:  S3    FIBROSCAN 11/12/2024:    Findings  Median liver stiffness score:  7.9  CAP Reading: dB/m:  330     IQR/med %:  9  Interpretation  Fibrosis interpretation is based on medial liver stiffness - Kilopascal (kPa).     Fibrosis Stage:  F2  Steatosis interpretation is based on controlled attenuation parameter - (dB/m).     Steatosis Grade:  S3    CT Abdomen With IV Contrast Routine Oral Contrast  Narrative: EXAMINATION:  CT ABDOMEN WITH IV CONTRAST    CLINICAL HISTORY:  Pancreatic cyst;  Nonalcoholic steatohepatitis (ELDRIDGE)    TECHNIQUE:  CT of the abdomen was performed following the intravenous administration of 100 cc Omnipaque 350 and the oral  administration of 450 cc Readi-Cat.  Axial sagittal and coronal reformatted images submitted.    COMPARISON:  MRI August 2020    FINDINGS:  In the chest, no significant pleural or pericardial fluid.  Some air in the distal esophagus either reflux or dysmotility.  Some volume loss at the lung bases.  No confluent consolidation.    In the abdomen, liver is significantly enlarged 22.7 cm.  7 mm enhancing nodule left lobe of the liver likely a flash filling hemangioma similar to MRI.  Gallbladder is been removed.  No biliary dilatation.  2.1 x 1.8 x 2.9 cm septated hypodensity in the pancreatic body increased in size compared to the prior MRI.  Additional 1.6 cm rim enhancing hypodensity anterior aspect of the pancreatic tail previously 1.9 cm.  Spleen not enlarged.  No adrenal mass.  Presumed parapelvic cyst left kidney.  No enhancing renal masses.  No hydronephrosis.    Aorta tapers normally.  No convincing para-aortic adenopathy.  Visualized loops of bowel are not dilated.  Portal vein, splenic vein, and SMV are patent.    Small fat containing umbilical hernia.  Degenerative change in the spine.  No convincing lytic nor blastic lesion.  Impression: Increased size of the septated hypodensity posterior aspect of the pancreatic body with measurements as above.  Additional 1.6 cm rim enhancing hypodensity anterior aspect of the pancreatic tail slightly decreased in size.  Further evaluation with MRI/EUS may be helpful.    Significant hepatomegaly.    Additional findings above.    This report was flagged in Epic as abnormal.    Electronically signed by: Anoop Giraldo MD  Date:    12/04/2024  Time:    11:29    ASSESSMENT & PLAN:  59 y.o. White female with:    1. Metabolic dysfunction-associated steatohepatitis (MASH)  Comprehensive Metabolic Panel    FibroScan Transplant Hepatology(Vibration Controlled Transient Elastography)    US Abdomen Complete      2. Early hepatic fibrosis  Comprehensive Metabolic Panel    FibroScan  Transplant Hepatology(Vibration Controlled Transient Elastography)    US Abdomen Complete      3. Hepatomegaly  Comprehensive Metabolic Panel    FibroScan Transplant Hepatology(Vibration Controlled Transient Elastography)    US Abdomen Complete      4. Elevated liver enzymes  Comprehensive Metabolic Panel    FibroScan Transplant Hepatology(Vibration Controlled Transient Elastography)    US Abdomen Complete      5. Class 1 obesity with body mass index (BMI) of 32.0 to 32.9 in adult, unspecified obesity type, unspecified whether serious comorbidity present  Comprehensive Metabolic Panel    FibroScan Transplant Hepatology(Vibration Controlled Transient Elastography)    US Abdomen Complete      6. Type 2 diabetes mellitus without complication, without long-term current use of insulin  Comprehensive Metabolic Panel    FibroScan Transplant Hepatology(Vibration Controlled Transient Elastography)    US Abdomen Complete      7. Hyperlipidemia, unspecified hyperlipidemia type  Comprehensive Metabolic Panel    FibroScan Transplant Hepatology(Vibration Controlled Transient Elastography)    US Abdomen Complete      8. Pancreatic cyst  Comprehensive Metabolic Panel    FibroScan Transplant Hepatology(Vibration Controlled Transient Elastography)    US Abdomen Complete        - Continue Rezdiffra 80 mg PO daily for the treatment of MASH with fibrosis.  - Recommend follow up Fibroscan to non-invasively restage liver disease 1 year after stating treatment.  - Recommend US of the liver annually, next due 12/2025.  - Repeat liver function tests every 3 months.  - Recommend weight loss of 15-20 lbs, through diet and exercise.  - Recommend good control of cholesterol, blood pressure, & blood sugar levels.  - Avoid alcohol and herbal supplements/alternative remedies.  - Continue follow up with Dr. Cedillo to monitor pancreatic cyst.  - Return to clinic in 6 months with labs, US and Fibroscan prior to the visit.    Thank you for allowing me  to participate in the care of [facility]       Hepatology Nurse Practitioner  [facility]    CC'ed note to:   No ref. provider found  Michael Singh MD

## 2025-04-28 ENCOUNTER — HOSPITAL ENCOUNTER (OUTPATIENT)
Dept: RADIOLOGY | Facility: HOSPITAL | Age: 60
Discharge: HOME | End: 2025-04-28
Attending: INTERNAL MEDICINE
Payer: MEDICAID

## 2025-04-28 DIAGNOSIS — Z12.31 ENCOUNTER FOR SCREENING MAMMOGRAM FOR MALIGNANT NEOPLASM OF BREAST: ICD-10-CM

## 2025-04-28 PROCEDURE — 77063 BREAST TOMOSYNTHESIS BI: CPT | Mod: TC

## 2025-04-28 PROCEDURE — 77063 BREAST TOMOSYNTHESIS BI: CPT | Mod: 26,,, | Performed by: RADIOLOGY

## 2025-04-28 PROCEDURE — 77067 SCR MAMMO BI INCL CAD: CPT | Mod: 26,,, | Performed by: RADIOLOGY

## 2025-07-15 ENCOUNTER — TELEPHONE (OUTPATIENT)
Dept: HEPATOLOGY | Facility: CLINIC | Age: 60
End: 2025-07-15
Payer: MEDICAID

## 2025-07-15 ENCOUNTER — LAB VISIT (OUTPATIENT)
Dept: LAB | Facility: HOSPITAL | Age: 60
End: 2025-07-15
Payer: MEDICAID

## 2025-07-15 DIAGNOSIS — E66.811 CLASS 1 OBESITY WITH BODY MASS INDEX (BMI) OF 32.0 TO 32.9 IN ADULT, UNSPECIFIED OBESITY TYPE, UNSPECIFIED WHETHER SERIOUS COMORBIDITY PRESENT: ICD-10-CM

## 2025-07-15 DIAGNOSIS — R74.8 ELEVATED LIVER ENZYMES: ICD-10-CM

## 2025-07-15 DIAGNOSIS — E11.9 TYPE 2 DIABETES MELLITUS WITHOUT COMPLICATION, WITHOUT LONG-TERM CURRENT USE OF INSULIN: ICD-10-CM

## 2025-07-15 DIAGNOSIS — R16.0 HEPATOMEGALY: ICD-10-CM

## 2025-07-15 DIAGNOSIS — K75.81 METABOLIC DYSFUNCTION-ASSOCIATED STEATOHEPATITIS (MASH): ICD-10-CM

## 2025-07-15 DIAGNOSIS — K86.2 PANCREATIC CYST: ICD-10-CM

## 2025-07-15 DIAGNOSIS — K74.01 EARLY HEPATIC FIBROSIS: ICD-10-CM

## 2025-07-15 DIAGNOSIS — E78.5 HYPERLIPIDEMIA, UNSPECIFIED HYPERLIPIDEMIA TYPE: ICD-10-CM

## 2025-07-15 LAB
ALBUMIN SERPL BCP-MCNC: 3.6 G/DL (ref 3.5–5.2)
ALP SERPL-CCNC: 88 UNIT/L (ref 40–150)
ALT SERPL W/O P-5'-P-CCNC: 54 UNIT/L (ref 10–44)
ANION GAP (OHS): 9 MMOL/L (ref 8–16)
AST SERPL-CCNC: 40 UNIT/L (ref 11–45)
BILIRUB SERPL-MCNC: 0.6 MG/DL (ref 0.1–1)
BUN SERPL-MCNC: 12 MG/DL (ref 6–20)
CALCIUM SERPL-MCNC: 9.2 MG/DL (ref 8.7–10.5)
CHLORIDE SERPL-SCNC: 107 MMOL/L (ref 95–110)
CO2 SERPL-SCNC: 26 MMOL/L (ref 23–29)
CREAT SERPL-MCNC: 0.6 MG/DL (ref 0.5–1.4)
GFR SERPLBLD CREATININE-BSD FMLA CKD-EPI: >60 ML/MIN/1.73/M2
GLUCOSE SERPL-MCNC: 90 MG/DL (ref 70–110)
POTASSIUM SERPL-SCNC: 4.2 MMOL/L (ref 3.5–5.1)
PROT SERPL-MCNC: 7.6 GM/DL (ref 6–8.4)
SODIUM SERPL-SCNC: 142 MMOL/L (ref 136–145)

## 2025-07-15 PROCEDURE — 80053 COMPREHEN METABOLIC PANEL: CPT

## 2025-07-15 PROCEDURE — 36415 COLL VENOUS BLD VENIPUNCTURE: CPT

## 2025-07-15 NOTE — TELEPHONE ENCOUNTER
----- Message from Rosemary Macdonald NP sent at 7/15/2025  3:38 PM CDT -----  Please contact patient and schedule labs (CMP only) in early October, a week or so prior to next visit. Thanks!  ----- Message -----  From: Lab, Background User  Sent: 7/15/2025   3:26 PM CDT  To: Rosemary Macdonald NP